# Patient Record
Sex: FEMALE | Race: WHITE | NOT HISPANIC OR LATINO | Employment: FULL TIME | URBAN - NONMETROPOLITAN AREA
[De-identification: names, ages, dates, MRNs, and addresses within clinical notes are randomized per-mention and may not be internally consistent; named-entity substitution may affect disease eponyms.]

---

## 2017-03-22 ENCOUNTER — OFFICE VISIT (OUTPATIENT)
Dept: URGENT CARE | Facility: PHYSICIAN GROUP | Age: 62
End: 2017-03-22
Payer: COMMERCIAL

## 2017-03-22 VITALS
HEART RATE: 70 BPM | OXYGEN SATURATION: 98 % | WEIGHT: 162 LBS | BODY MASS INDEX: 23.99 KG/M2 | HEIGHT: 69 IN | DIASTOLIC BLOOD PRESSURE: 80 MMHG | RESPIRATION RATE: 16 BRPM | SYSTOLIC BLOOD PRESSURE: 116 MMHG | TEMPERATURE: 98 F

## 2017-03-22 DIAGNOSIS — H01.004 BLEPHARITIS OF LEFT UPPER EYELID, UNSPECIFIED TYPE: ICD-10-CM

## 2017-03-22 PROCEDURE — 99214 OFFICE O/P EST MOD 30 MIN: CPT | Performed by: PHYSICIAN ASSISTANT

## 2017-03-22 RX ORDER — BACITRACIN 500 [USP'U]/G
0.5 OINTMENT OPHTHALMIC 4 TIMES DAILY
Qty: 1 TUBE | Refills: 0 | Status: SHIPPED | OUTPATIENT
Start: 2017-03-22 | End: 2021-02-06

## 2017-03-22 ASSESSMENT — ENCOUNTER SYMPTOMS
EYE PAIN: 0
BLURRED VISION: 0
DOUBLE VISION: 0
NAUSEA: 0
FEVER: 0
VOMITING: 0
CHILLS: 0
FOREIGN BODY SENSATION: 0
EYE REDNESS: 1
PHOTOPHOBIA: 0
EYE DISCHARGE: 1

## 2017-03-22 NOTE — PROGRESS NOTES
Subjective:      Vidhya Martinez is a 61 y.o. female who presents with Eye Problem            HPI Comments: Patient reports mild left upper eyelid erythema and swelling over the last couple of days which seems to be gradually worsening. Denies any trauma. Also reports the amount of discharge with crusting in the morning.    Eye Problem   The left eye is affected. This is a new problem. The current episode started in the past 7 days. The problem occurs constantly. The problem has been gradually worsening. There was no injury mechanism. The patient is experiencing no pain. There is no known exposure to pink eye. Associated symptoms include an eye discharge and eye redness. Pertinent negatives include no blurred vision, double vision, fever, foreign body sensation, itching, nausea, photophobia, recent URI or vomiting. She has tried nothing for the symptoms. The treatment provided no relief.       Review of Systems   Constitutional: Negative for fever and chills.   Eyes: Positive for discharge and redness. Negative for blurred vision, double vision, photophobia and pain.   Gastrointestinal: Negative for nausea and vomiting.   Skin: Negative for itching.     Allergies:Pcn and Sulfa drugs    Current Outpatient Prescriptions Ordered in [facility]   Medication Sig Dispense Refill   • Naproxen Sodium (ALEVE PO) Take  by mouth.     • bacitracin 500 UNIT/GM Ointment Place 0.5 Inches in left eye 4 times a day. 1 Tube 0     No current Epic-ordered facility-administered medications on file.       History reviewed. No pertinent past medical history.    Social History   Substance Use Topics   • Smoking status: Light Tobacco Smoker     Types: Cigarettes   • Smokeless tobacco: Former User      Comment: social smoker    • Alcohol Use: Yes      Comment: rare       No family status information on file.   History reviewed. No pertinent family history.       Objective:     /80 mmHg  Pulse 70  Temp(Src) 36.7 °C (98 °F)  Resp 16   "Ht 1.753 m (5' 9\")  Wt 73.483 kg (162 lb)  BMI 23.91 kg/m2  SpO2 98%     Physical Exam   Constitutional: She is oriented to person, place, and time. She appears well-developed and well-nourished. No distress.   HENT:   Head: Normocephalic and atraumatic.   Right Ear: External ear normal.   Left Ear: External ear normal.   Mouth/Throat: Oropharynx is clear and moist.   Eyes: EOM are normal. Pupils are equal, round, and reactive to light. Right eye exhibits no discharge. Left eye exhibits no discharge.   Mild erythema and edema of the left upper eyelid. Mild left conjunctival erythema   Neck: Normal range of motion. Neck supple.   Cardiovascular: Normal rate.    Pulmonary/Chest: Effort normal.   Neurological: She is alert and oriented to person, place, and time.   Skin: Skin is warm and dry. She is not diaphoretic.   Psychiatric: She has a normal mood and affect. Her behavior is normal. Judgment and thought content normal.   Nursing note and vitals reviewed.              Assessment/Plan:     1. Blepharitis of left upper eyelid, unspecified type  bacitracin 500 UNIT/GM Ointment    Mild erythema and very mild swelling. Start bacitracin ophthalmic. Follow-up with PCP. Return if worsening.       Elsevier Interactive Patient Education given: Blepharitis    Please note that this dictation was created using voice recognition software. I have made every reasonable attempt to correct obvious errors, but I expect that there are errors of grammar and possibly content that I did not discover before finalizing the note.      "

## 2017-03-22 NOTE — Clinical Note
March 22, 2017         Patient: Vidhya Martinez   YOB: 1955   Date of Visit: 3/22/2017           To Whom it May Concern:    Vidhya Martinez was seen in my clinic on 3/22/2017. Please excuse from work today. She may return tomorrow as long as symptoms have improved.    If you have any questions or concerns, please don't hesitate to call.        Sincerely,           Vicente Chinchilla PA-C  Electronically Signed

## 2017-03-22 NOTE — PATIENT INSTRUCTIONS
Blepharitis  Blepharitis is redness, soreness, and swelling (inflammation) of one or both eyelids. It may be caused by an allergic reaction or a bacterial infection. Blepharitis may also be associated with reddened, scaly skin (seborrhea) of the scalp and eyebrows. While you sleep, eye discharge may cause your eyelashes to stick together. Your eyelids may itch, burn, swell, and may lose their lashes. These will grow back. Your eyes may become sensitive. Blepharitis may recur and need repeated treatment. If this is the case, you may require further evaluation by an eye specialist (ophthalmologist).  HOME CARE INSTRUCTIONS   · Keep your hands clean.  · Use a clean towel each time you dry your eyelids. Do not use this towel to clean other areas. Do not share a towel or makeup with anyone.  · Wash your eyelids with warm water or warm water mixed with a small amount of baby shampoo. Do this twice a day or as often as needed.  · Wash your face and eyebrows at least once a day.  · Use warm compresses 2 times a day for 10 minutes at a time, or as directed by your caregiver.  · Apply antibiotic ointment as directed by your caregiver.  · Avoid rubbing your eyes.  · Avoid wearing makeup until you get better.  · Follow up with your caregiver as directed.  SEEK IMMEDIATE MEDICAL CARE IF:   · You have pain, redness, or swelling that gets worse or spreads to other parts of your face.  · Your vision changes, or you have pain when looking at lights or moving objects.  · You have a fever.  · Your symptoms continue for longer than 2 to 4 days or become worse.  MAKE SURE YOU:   · Understand these instructions.  · Will watch your condition.  · Will get help right away if you are not doing well or get worse.     This information is not intended to replace advice given to you by your health care provider. Make sure you discuss any questions you have with your health care provider.     Document Released: 12/15/2001 Document Revised:  03/11/2013 Document Reviewed: 04/11/2016  Elsevier Interactive Patient Education ©2016 Elsevier Inc.

## 2017-03-22 NOTE — MR AVS SNAPSHOT
"        Vidhya Martinez   3/22/2017 9:15 AM   Office Visit   MRN: [id number]    Department:  [facility]   Dept Phone:  [phone]    Description:  Female : 1955   Provider:  MOO Russell           Reason for Visit     Eye Problem pt states left eye redness x3 days       Allergies as of 3/22/2017     Allergen Noted Reactions    Pcn [Penicillins] 2011       Sulfa Drugs 2011         You were diagnosed with     Blepharitis of left upper eyelid, unspecified type   [[id number]]         Vital Signs     Blood Pressure Pulse Temperature Respirations Height Weight    116/80 mmHg 70 36.7 °C (98 °F) 16 1.753 m (5' 9\") 73.483 kg (162 lb)    Body Mass Index Oxygen Saturation Smoking Status             23.91 kg/m2 98% Light Tobacco Smoker         Basic Information     Date Of Birth Sex Race Ethnicity Preferred Language    1955 Female White Unknown English      Health Maintenance        Date Due Completion Dates    IMM DTaP/Tdap/Td Vaccine (1 - Tdap) 1976 ---    PAP SMEAR 1978 ---    MAMMOGRAM 1997 ---    COLONOSCOPY 2007 ---    IMM INFLUENZA (1) 2016 ---            Current Immunizations     Tuberculin Skin Test 5/10/2016  9:51 AM, 5/3/2016  3:17 PM      Below and/or attached are the medications your provider expects you to take. Review all of your home medications and newly ordered medications with your provider and/or pharmacist. Follow medication instructions as directed by your provider and/or pharmacist. Please keep your medication list with you and share with your provider. Update the information when medications are discontinued, doses are changed, or new medications (including over-the-counter products) are added; and carry medication information at all times in the event of emergency situations     Allergies:  PCN - (reactions not documented)     SULFA DRUGS - (reactions not documented)               Medications  Valid as of: 2017 - 10:10 AM   " Generic Name Brand Name Tablet Size Instructions for use    Bacitracin (Ointment) bacitracin 500 UNIT/GM Place 0.5 Inches in left eye 4 times a day.        Naproxen Sodium   Take  by mouth.        .                 Medicines prescribed today were sent to:     [facility][id number] - [place], [place] - [address]    [address] [place] [address]    Phone: [phone] Fax: [phone]    Open 24 Hours?: No      Medication refill instructions:       If your prescription bottle indicates you have medication refills left, it is not necessary to call your provider’s office. Please contact your pharmacy and they will refill your medication.    If your prescription bottle indicates you do not have any refills left, you may request refills at any time through one of the following ways: The online [employer] system (except Urgent Care), by calling your provider’s office, or by asking your pharmacy to contact your provider’s office with a refill request. Medication refills are processed only during regular business hours and may not be available until the next business day. Your provider may request additional information or to have a follow-up visit with you prior to refilling your medication.   *Please Note: Medication refills are assigned a new Rx number when refilled electronically. Your pharmacy may indicate that no refills were authorized even though a new prescription for the same medication is available at the pharmacy. Please request the medicine by name with the pharmacy before contacting your provider for a refill.        Instructions    Blepharitis  Blepharitis is redness, soreness, and swelling (inflammation) of one or both eyelids. It may be caused by an allergic reaction or a bacterial infection. Blepharitis may also be associated with reddened, scaly skin (seborrhea) of the scalp and eyebrows. While you sleep, eye discharge may cause your eyelashes to stick together. Your eyelids may itch, burn, swell, and  may lose their lashes. These will grow back. Your eyes may become sensitive. Blepharitis may recur and need repeated treatment. If this is the case, you may require further evaluation by an eye specialist (ophthalmologist).  HOME CARE INSTRUCTIONS   · Keep your hands clean.  · Use a clean towel each time you dry your eyelids. Do not use this towel to clean other areas. Do not share a towel or makeup with anyone.  · Wash your eyelids with warm water or warm water mixed with a small amount of baby shampoo. Do this twice a day or as often as needed.  · Wash your face and eyebrows at least once a day.  · Use warm compresses 2 times a day for 10 minutes at a time, or as directed by your caregiver.  · Apply antibiotic ointment as directed by your caregiver.  · Avoid rubbing your eyes.  · Avoid wearing makeup until you get better.  · Follow up with your caregiver as directed.  SEEK IMMEDIATE MEDICAL CARE IF:   · You have pain, redness, or swelling that gets worse or spreads to other parts of your face.  · Your vision changes, or you have pain when looking at lights or moving objects.  · You have a fever.  · Your symptoms continue for longer than 2 to 4 days or become worse.  MAKE SURE YOU:   · Understand these instructions.  · Will watch your condition.  · Will get help right away if you are not doing well or get worse.     This information is not intended to replace advice given to you by your health care provider. Make sure you discuss any questions you have with your health care provider.     Document Released: 12/15/2001 Document Revised: 03/11/2013 Document Reviewed: 04/11/2016  [employer] Interactive Patient Education ©2016 Elsevier Inc.            [employer] Access Code: Activation code not generated  Current [employer] Status: Active          Quit Tobacco Information     Do you want to quit using tobacco?    Quitting tobacco decreases risks of cancer, heart and lung disease, increases life expectancy, improves sense of  taste and smell, and increases spending money, among other benefits.    If you are thinking about quitting, we can help.  • [facility] Quit Tobacco Program: 945.321.8060  o Program occurs weekly for four weeks and includes pharmacist consultation on products to support quitting smoking or chewing tobacco. A provider referral is needed for pharmacist consultation.  • Tobacco Users Help Hotline: 5-582-QUIT-NOW (163-6292) or https://[place].quitlogix.org/  o Free, confidential telephone and online coaching for [place] residents. Sessions are designed on a schedule that is convenient for you. Eligible clients receive free nicotine replacement therapy.  • Nationally: www.smokefree.gov  o Information and professional assistance to support both immediate and long-term needs as you become, and remain, a non-smoker. Smokefree.gov allows you to choose the help that best fits your needs.

## 2018-08-28 ENCOUNTER — OFFICE VISIT (OUTPATIENT)
Dept: URGENT CARE | Facility: PHYSICIAN GROUP | Age: 63
End: 2018-08-28
Payer: COMMERCIAL

## 2018-08-28 ENCOUNTER — HOSPITAL ENCOUNTER (OUTPATIENT)
Facility: MEDICAL CENTER | Age: 63
End: 2018-08-28
Attending: PHYSICIAN ASSISTANT
Payer: COMMERCIAL

## 2018-08-28 VITALS
DIASTOLIC BLOOD PRESSURE: 88 MMHG | RESPIRATION RATE: 16 BRPM | OXYGEN SATURATION: 98 % | SYSTOLIC BLOOD PRESSURE: 128 MMHG | TEMPERATURE: 97 F | WEIGHT: 160 LBS | HEART RATE: 96 BPM | HEIGHT: 69 IN | BODY MASS INDEX: 23.7 KG/M2

## 2018-08-28 DIAGNOSIS — N30.01 ACUTE CYSTITIS WITH HEMATURIA: ICD-10-CM

## 2018-08-28 DIAGNOSIS — R30.0 DYSURIA: ICD-10-CM

## 2018-08-28 LAB
APPEARANCE UR: NORMAL
BILIRUB UR STRIP-MCNC: NORMAL MG/DL
COLOR UR AUTO: NORMAL
GLUCOSE UR STRIP.AUTO-MCNC: NORMAL MG/DL
KETONES UR STRIP.AUTO-MCNC: NORMAL MG/DL
LEUKOCYTE ESTERASE UR QL STRIP.AUTO: NORMAL
NITRITE UR QL STRIP.AUTO: POSITIVE
PH UR STRIP.AUTO: 7 [PH] (ref 5–8)
PROT UR QL STRIP: 100 MG/DL
RBC UR QL AUTO: NORMAL
SP GR UR STRIP.AUTO: 1.02
UROBILINOGEN UR STRIP-MCNC: NORMAL MG/DL

## 2018-08-28 PROCEDURE — 87186 SC STD MICRODIL/AGAR DIL: CPT

## 2018-08-28 PROCEDURE — 81002 URINALYSIS NONAUTO W/O SCOPE: CPT | Performed by: PHYSICIAN ASSISTANT

## 2018-08-28 PROCEDURE — 99214 OFFICE O/P EST MOD 30 MIN: CPT | Performed by: PHYSICIAN ASSISTANT

## 2018-08-28 PROCEDURE — 87077 CULTURE AEROBIC IDENTIFY: CPT

## 2018-08-28 PROCEDURE — 87086 URINE CULTURE/COLONY COUNT: CPT

## 2018-08-28 RX ORDER — CIPROFLOXACIN 500 MG/1
500 TABLET, FILM COATED ORAL EVERY 12 HOURS
Qty: 14 TAB | Refills: 0 | Status: SHIPPED | OUTPATIENT
Start: 2018-08-28 | End: 2018-09-04

## 2018-08-28 RX ORDER — PHENAZOPYRIDINE HYDROCHLORIDE 200 MG/1
200 TABLET, FILM COATED ORAL 3 TIMES DAILY
Qty: 6 TAB | Refills: 0 | Status: SHIPPED | OUTPATIENT
Start: 2018-08-28 | End: 2018-08-30

## 2018-08-29 DIAGNOSIS — N30.01 ACUTE CYSTITIS WITH HEMATURIA: ICD-10-CM

## 2018-08-29 NOTE — PROGRESS NOTES
"Chief Complaint   Patient presents with   • UTI       HISTORY OF PRESENT ILLNESS: Patient is a 62 y.o. female who presents today because she has a 3 day history of increased urinary urgency, and dysuria.  Denies any fevers, chills, nausea, vomiting or diarrhea or flank pain.  She has tried some over-the-counter Azo and that did not help    There are no active problems to display for this patient.      Allergies:Pcn [penicillins] and Sulfa drugs    Current Outpatient Prescriptions Ordered in [facility]   Medication Sig Dispense Refill   • ciprofloxacin (CIPRO) 500 MG Tab Take 1 Tab by mouth every 12 hours for 7 days. 14 Tab 0   • phenazopyridine (PYRIDIUM) 200 MG Tab Take 1 Tab by mouth 3 times a day for 2 days. 6 Tab 0   • Naproxen Sodium (ALEVE PO) Take  by mouth.     • bacitracin 500 UNIT/GM Ointment Place 0.5 Inches in left eye 4 times a day. 1 Tube 0     No current Epic-ordered facility-administered medications on file.        No past medical history on file.    Social History   Substance Use Topics   • Smoking status: Light Tobacco Smoker     Types: Cigarettes   • Smokeless tobacco: Former User      Comment: social smoker    • Alcohol use Yes      Comment: rare       No family status information on file.   No family history on file.    ROS:  Review of Systems   Constitutional: Negative for fever, chills, weight loss and malaise/fatigue.   HENT: Negative for ear pain, nosebleeds, congestion, sore throat and neck pain.    Eyes: Negative for blurred vision.   Respiratory: Negative for cough, sputum production, shortness of breath and wheezing.    Cardiovascular: Negative for chest pain, palpitations, orthopnea and leg swelling.   Gastrointestinal: Negative for heartburn, nausea, vomiting and positive for lower abdominal pain.   Genitourinary: Positive for dysuria, urgency and frequency.     Exam:  Blood pressure 128/88, pulse 96, temperature 36.1 °C (97 °F), resp. rate 16, height 1.753 m (5' 9\"), weight 72.6 kg (160 " lb), SpO2 98 %.  General:  Well nourished, well developed female in NAD  Head:Normocephalic, atraumatic  Eyes: PERRLA, EOM within normal limits, no conjunctival injection, no scleral icterus, visual fields and acuity grossly intact.  Pulmonary: chest is symmetrical with respiration, no wheezes, crackles, or rhonchi.  Cardiovascular: regular rate and rhythm without murmurs, rubs, or gallops.  Abdomen: Nondistended, bowel tones in all 4 quadrants, soft, she has suprapubic tenderness to palpation, no other tenderness to palpation  Extremities: no clubbing, cyanosis, or edema.    Urinalysis shows moderate amount of leukocyte esterase, positive nitrates, 100 protein and moderate blood    Please note that this dictation was created using voice recognition software. I have made every reasonable attempt to correct obvious errors, but I expect that there are errors of grammar and possibly content that I did not discover before finalizing the note.    Assessment/Plan:  1. Acute cystitis with hematuria  Urine Culture    ciprofloxacin (CIPRO) 500 MG Tab   2. Dysuria  POCT Urinalysis    phenazopyridine (PYRIDIUM) 200 MG Tab   Increase p.o. fluids    Followup with primary care in the next 7-10 days if not significantly improving, return to the urgent care or go to the emergency room sooner for any worsening of symptoms.

## 2018-08-29 NOTE — PATIENT INSTRUCTIONS
"Smoking Cessation, Tips for Success  If you are ready to quit smoking, congratulations! You have chosen to help yourself be healthier. Cigarettes bring nicotine, tar, carbon monoxide, and other irritants into your body. Your lungs, heart, and blood vessels will be able to work better without these poisons. There are many different ways to quit smoking. Nicotine gum, nicotine patches, a nicotine inhaler, or nicotine nasal spray can help with physical craving. Hypnosis, support groups, and medicines help break the habit of smoking.  WHAT THINGS CAN I DO TO MAKE QUITTING EASIER?   Here are some tips to help you quit for good:  · Pick a date when you will quit smoking completely. Tell all of your friends and family about your plan to quit on that date.  · Do not try to slowly cut down on the number of cigarettes you are smoking. Pick a quit date and quit smoking completely starting on that day.  · Throw away all cigarettes.    · Clean and remove all ashtrays from your home, work, and car.  · On a card, write down your reasons for quitting. Carry the card with you and read it when you get the urge to smoke.  · Cleanse your body of nicotine. Drink enough water and fluids to keep your urine clear or pale yellow. Do this after quitting to flush the nicotine from your body.  · Learn to predict your moods. Do not let a bad situation be your excuse to have a cigarette. Some situations in your life might tempt you into wanting a cigarette.  · Never have \"just one\" cigarette. It leads to wanting another and another. Remind yourself of your decision to quit.  · Change habits associated with smoking. If you smoked while driving or when feeling stressed, try other activities to replace smoking. Stand up when drinking your coffee. Brush your teeth after eating. Sit in a different chair when you read the paper. Avoid alcohol while trying to quit, and try to drink fewer caffeinated beverages. Alcohol and caffeine may urge you to " "smoke.  · Avoid foods and drinks that can trigger a desire to smoke, such as sugary or spicy foods and alcohol.  · Ask people who smoke not to smoke around you.  · Have something planned to do right after eating or having a cup of coffee. For example, plan to take a walk or exercise.  · Try a relaxation exercise to calm you down and decrease your stress. Remember, you may be tense and nervous for the first 2 weeks after you quit, but this will pass.  · Find new activities to keep your hands busy. Play with a pen, coin, or rubber band. Doodle or draw things on paper.  · Brush your teeth right after eating. This will help cut down on the craving for the taste of tobacco after meals. You can also try mouthwash.    · Use oral substitutes in place of cigarettes. Try using lemon drops, carrots, cinnamon sticks, or chewing gum. Keep them handy so they are available when you have the urge to smoke.  · When you have the urge to smoke, try deep breathing.  · Designate your home as a nonsmoking area.  · If you are a heavy smoker, ask your health care provider about a prescription for nicotine chewing gum. It can ease your withdrawal from nicotine.  · Reward yourself. Set aside the cigarette money you save and buy yourself something nice.  · Look for support from others. Join a support group or smoking cessation program. Ask someone at home or at work to help you with your plan to quit smoking.  · Always ask yourself, \"Do I need this cigarette or is this just a reflex?\" Tell yourself, \"Today, I choose not to smoke,\" or \"I do not want to smoke.\" You are reminding yourself of your decision to quit.  · Do not replace cigarette smoking with electronic cigarettes (commonly called e-cigarettes). The safety of e-cigarettes is unknown, and some may contain harmful chemicals.  · If you relapse, do not give up! Plan ahead and think about what you will do the next time you get the urge to smoke.  HOW WILL I FEEL WHEN I QUIT SMOKING?  You " may have symptoms of withdrawal because your body is used to nicotine (the addictive substance in cigarettes). You may crave cigarettes, be irritable, feel very hungry, cough often, get headaches, or have difficulty concentrating. The withdrawal symptoms are only temporary. They are strongest when you first quit but will go away within 10-14 days. When withdrawal symptoms occur, stay in control. Think about your reasons for quitting. Remind yourself that these are signs that your body is healing and getting used to being without cigarettes. Remember that withdrawal symptoms are easier to treat than the major diseases that smoking can cause.   Even after the withdrawal is over, expect periodic urges to smoke. However, these cravings are generally short lived and will go away whether you smoke or not. Do not smoke!  WHAT RESOURCES ARE AVAILABLE TO HELP ME QUIT SMOKING?  Your health care provider can direct you to community resources or hospitals for support, which may include:  · Group support.  · Education.  · Hypnosis.  · Therapy.     This information is not intended to replace advice given to you by your health care provider. Make sure you discuss any questions you have with your health care provider.     Document Released: 09/15/2005 Document Revised: 01/08/2016 Document Reviewed: 06/05/2014  ReviewPro Interactive Patient Education ©2016 [employer] Inc.

## 2018-08-31 LAB
BACTERIA UR CULT: ABNORMAL
BACTERIA UR CULT: ABNORMAL
SIGNIFICANT IND 70042: ABNORMAL
SITE SITE: ABNORMAL
SOURCE SOURCE: ABNORMAL

## 2019-09-27 ENCOUNTER — OCCUPATIONAL MEDICINE (OUTPATIENT)
Dept: URGENT CARE | Facility: PHYSICIAN GROUP | Age: 64
End: 2019-09-27
Payer: COMMERCIAL

## 2019-09-27 VITALS
OXYGEN SATURATION: 96 % | RESPIRATION RATE: 16 BRPM | BODY MASS INDEX: 25.18 KG/M2 | HEIGHT: 69 IN | HEART RATE: 84 BPM | SYSTOLIC BLOOD PRESSURE: 120 MMHG | TEMPERATURE: 98.6 F | WEIGHT: 170 LBS | DIASTOLIC BLOOD PRESSURE: 76 MMHG

## 2019-09-27 DIAGNOSIS — S39.012A STRAIN OF LUMBAR REGION, INITIAL ENCOUNTER: ICD-10-CM

## 2019-09-27 PROCEDURE — 99214 OFFICE O/P EST MOD 30 MIN: CPT | Mod: 29 | Performed by: PHYSICIAN ASSISTANT

## 2019-09-27 RX ORDER — CYCLOBENZAPRINE HCL 5 MG
5-10 TABLET ORAL NIGHTLY PRN
Qty: 15 TAB | Refills: 0 | Status: SHIPPED | OUTPATIENT
Start: 2019-09-27 | End: 2021-02-06

## 2019-09-27 ASSESSMENT — ENCOUNTER SYMPTOMS
TINGLING: 0
MYALGIAS: 0
BACK PAIN: 1
WEAKNESS: 0
NECK PAIN: 0
SENSORY CHANGE: 0
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
## 2019-09-27 NOTE — LETTER
September 27, 2019         Patient: Vidhya Martinez   YOB: 1955   Date of Visit: 9/27/2019           To Whom it May Concern:    Vidhya Martinez was seen in my clinic on 9/27/2019. She can return to work 9/30/19 with restrictions.     If you have any questions or concerns, please don't hesitate to call.        Sincerely,           Lupillo Jimenez P.A.-C.  Electronically Signed
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
## 2019-09-27 NOTE — LETTER
"EMPLOYEE’S CLAIM FOR COMPENSATION/ REPORT OF INITIAL TREATMENT  FORM C-4    EMPLOYEE’S CLAIM - PROVIDE ALL INFORMATION REQUESTED   First Name  Vidhya Last Name  Michelle Birthdate                    1955                Sex  female Claim Number   Home Address  [address] [place] Age  63 y.o. Height  1.753 m (5' 9\") Weight  77.1 kg (170 lb) [facility]     [facility] [place]  [address] Telephone  [phone] (home)    Mailing Address  [address] [place] Zip  [address] Primary Language Spoken  English    Insurer   Third Party   [employer]   Employee's Occupation (Job Title) When Injury or Occupational Disease Occurred      Employer's Name  [employer]  Telephone  [phone]   Employer Address  [address]  [address]    Date of Injury  9/26/2019               Hour of Injury  1:15 PM Date Employer Notified  9/26/2019 Last Day of Work after Injury or Occupational Disease  9/27/2019 Supervisor to Whom Injury Reported  Jonna   Address or Location of Accident (if applicable)  [[address] [place] [address]]   What were you doing at the time of accident? 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 (Be specific an answer in detail. Use additional sheet if necessary)  Lifting a rather large patient from a nichole that would not be able to raise patient high enough to get them back in bed with 3 other co-workers   If you believe that you have an occupational disease, when did you first have knowledge of the disability and it relationship to your employment?  na Witnesses to the Accident  3 co-workers      Nature of Injury or Occupational Disease  Strain  Part(s) of Body Injured or Affected  Lower Back Area (Lumbar Area & Lumbo-Sacral), ,     I certify that the above is true and " correct to the best of my knowledge and that I have provided this information in order to obtain the benefits of [place]  I hereby authorize any physician, chiropractor, surgeon, practitioner, or other person, any hospital, including [facility], any medical service organization, any insurance company, or other institution or organization to release to each other, any medical or other information, including benefits paid or payable, pertinent to this injury or disease, except information relative to diagnosis, treatment and/or counseling for AIDS, psychological conditions, alcohol or controlled substances, for which I must give specific authorization.  A Photostat of this authorization shall be as valid as the original.     Date 09/27/2019   [facility]   Employee’s Signature   THIS REPORT MUST BE COMPLETED AND MAILED WITHIN 3 WORKING DAYS OF TREATMENT   Place  [facility]  Name of [facility]   Date  9/27/2019 Diagnosis  (S39.012A) Strain of lumbar region, initial encounter Is there evidence the injured employee was under the influence of alcohol and/or another controlled substance at the time of accident?   Hour  10:02 AM Description of Injury or Disease  The encounter diagnosis was Strain of lumbar region, initial encounter. No   Treatment  Patient will continue using ibuprofen throughout the day and muscle relaxers at night as needed.  She will use heat on the area and discussed gentle stretches with patient.  She plans on resting her back over the weekend.  Patient will follow-up on Monday for reevaluation.  Have you advised the patient to remain off work five days or more? No   X-Ray Findings      If Yes   From Date  To Date      From information given by the employee, together with medical evidence, can you directly connect this injury or  "occupational disease as job incurred?  Yes If No Full Duty  No Modified Duty  Yes   Is additional medical care by a physician indicated?  Yes If Modified Duty, Specify any Limitations / Restrictions  Restrictions per D 39   Do you know of any previous injury or disease contributing to this condition or occupational disease?                            No   Date  9/27/2019 Print Doctor’s Name Rochelle Jimenez P.A.-C. I certify the employer’s copy of  this form was mailed on:   Address  [address] [place] [address] Insurer’s Use Only     [facility]  [address]    Provider’s Tax ID Number  [id number] Telephone  Dept: [phone]        e-ROCHELLE Falcon P.A.-C.   e-Signature: Dr. Eusebio Carvajal, Medical Director Degree  MD        ORIGINAL-TREATING PHYSICIAN OR CHIROPRACTOR    PAGE 2-INSURER/TPA    PAGE 3-EMPLOYER    PAGE 4-EMPLOYEE             Form C-4 (rev.10/07)              BRIEF DESCRIPTION OF RIGHTS AND BENEFITS  (Pursuant to [place]RS 616C.050)    Notice of Injury or Occupational Disease (Incident Report Form C-1): If an injury or occupational disease (OD) arises out of and in the course of employment, you must provide written notice to your employer as soon as practicable, but no later than 7 days after the accident or OD. Your employer shall maintain a sufficient supply of the required forms.    Claim for Compensation (Form C-4): If medical treatment is sought, the form C-4 is available at the place of initial treatment. A completed \"Claim for Compensation\" (Form C-4) must be filed within 90 days after an accident or OD. The treating physician or chiropractor must, within 3 working days after treatment, complete and mail to the employer, the employer's insurer and third-party , the Claim for Compensation.    Medical Treatment: If you require medical treatment for your on-the-job injury or OD, you may be required to select a physician or chiropractor from a list provided by your workers’ compensation " insurer, if it has contracted with an Organization for Managed Care (MCO) or Preferred Provider Organization (PPO) or providers of health care. If your employer has not entered into a contract with an MCO or PPO, you may select a physician or chiropractor from the Panel of Physicians and Chiropractors. Any medical costs related to your industrial injury or OD will be paid by your insurer.    Temporary Total Disability (TTD): If your doctor has certified that you are unable to work for a period of at least 5 consecutive days, or 5 cumulative days in a 20-day period, or places restrictions on you that your employer does not accommodate, you may be entitled to TTD compensation.    Temporary Partial Disability (TPD): If the wage you receive upon reemployment is less than the compensation for TTD to which you are entitled, the insurer may be required to pay you TPD compensation to make up the difference. TPD can only be paid for a maximum of 24 months.    Permanent Partial Disability (PPD): When your medical condition is stable and there is an indication of a PPD as a result of your injury or OD, within 30 days, your insurer must arrange for an evaluation by a rating physician or chiropractor to determine the degree of your PPD. The amount of your PPD award depends on the date of injury, the results of the PPD evaluation and your age and wage.    Permanent Total Disability (PTD): If you are medically certified by a treating physician or chiropractor as permanently and totally disabled and have been granted a PTD status by your insurer, you are entitled to receive monthly benefits not to exceed 66 2/3% of your average monthly wage. The amount of your PTD payments is subject to reduction if you previously received a PPD award.    Vocational Rehabilitation Services: You may be eligible for vocational rehabilitation services if you are unable to return to the job due to a permanent physical impairment or permanent  restrictions as a result of your injury or occupational disease.    Transportation and Per Miranda Reimbursement: You may be eligible for travel expenses and per miranda associated with medical treatment.    Reopening: You may be able to reopen your claim if your condition worsens after claim closure.    Appeal Process: If you disagree with a written determination issued by the insurer or the insurer does not respond to your request, you may appeal to the Department of Administration, , by following the instructions contained in your determination letter. You must appeal the determination within 70 days from the date of the determination letter at [address], [address], [place], [place] [address], or [address], [address], [place], [place] [address]. If you disagree with the  decision, you may appeal to the Department of Administration, . You must file your appeal within 30 days from the date of the  decision letter at [address], [address], [place], [place] [address], or [address], [facility] [address], [place], [place] [address]. If you disagree with a decision of an , you may file a petition for judicial review with the District Court. You must do so within 30 days of the Appeal Officer’s decision. You may be represented by an  at your own expense or you may contact the [facility] for possible representation.    [place]  for Injured Workers (NAIW): If you disagree with a  decision, you may request that NAIW represent you without charge at an  Hearing. For information regarding denial of benefits, you may contact the [facility] at: [address], [address], [place] [address], [phone], or [address], [address] [address], [phone]    To File a Complaint with the Division: If you wish to file a complaint with the  of the Division of  Industrial Relations (DIR),  please contact the Workers’ Compensation Section, [address], [address], [place], [place] [address], telephone [phone]) [phone], or [address], [address], [place], [place] [address], telephone [phone].    For assistance with Workers’ Compensation Issues: you may contact the Office of the [facility] Consumer Health Assistance, [address] [place] [address], [address], [place], [place] [address], Toll Free 1-289.260.7628, Web site: http://[employer].[facility].[place]., E-mail [email or web address].                             __________________________________________________________________                                                                   ______09/27/2019_____                Employee Name / Signature                                                                                                                                                       Date                                                                                                                                                                                                     D-2 (rev. 06/18)

## 2019-09-30 ENCOUNTER — OCCUPATIONAL MEDICINE (OUTPATIENT)
Dept: URGENT CARE | Facility: PHYSICIAN GROUP | Age: 64
End: 2019-09-30
Payer: COMMERCIAL

## 2019-09-30 VITALS
OXYGEN SATURATION: 96 % | WEIGHT: 170 LBS | TEMPERATURE: 97.8 F | BODY MASS INDEX: 25.18 KG/M2 | RESPIRATION RATE: 14 BRPM | SYSTOLIC BLOOD PRESSURE: 118 MMHG | HEART RATE: 88 BPM | DIASTOLIC BLOOD PRESSURE: 82 MMHG | HEIGHT: 69 IN

## 2019-09-30 DIAGNOSIS — S39.012A STRAIN OF LUMBAR REGION, INITIAL ENCOUNTER: ICD-10-CM

## 2019-09-30 PROCEDURE — 99213 OFFICE O/P EST LOW 20 MIN: CPT | Performed by: FAMILY MEDICINE

## 2019-09-30 NOTE — LETTER
[facility]  [address] [place] [address] [place] Herrera [address]  Phone:  [phone] - Fax:  [phone]   [facility] Progress Report and Disability Certification  Date of Service: 9/30/2019   No Show:  No  Date / Time of Next Visit: 10/7/2019   Claim Information   Patient Name: Vidhya Martinez  Claim Number:     Employer: [employer]  Date of Injury: 9/26/2019     Insurer / TPA: [employer]  ID / SSN:     Occupation:   Diagnosis: The encounter diagnosis was Strain of lumbar region, initial encounter.    Medical Information   Related to Industrial Injury? Yes    Subjective Complaints:  DOI 9/26/2019: Patient states she was lifting heavy patient from a [employer] lift that did not raise the patient high enough with 3 other coworkers and they had to lift the patient over to the bed when she strained her lower back.    Follow-up 9/30/2019: Patient endorses moderate improvement in back pain with mild increased stiffness at this point with associated decreased range of motion.  Patient denies current numbness, tingling, or weakness.  Patient would like to increase her work possibilities.  No new symptoms.   Objective Findings: Moderate muscular spasm noted to bilateral paraspinal musculature in the lumbar region.  DTRs intact bilateral lower extremities.  Neurovascularly intact bilateral lower extremities.  Strength intact bilateral lower extremities.  Gait intact.   Pre-Existing Condition(s):     Assessment:   Condition Improved    Status: Additional Care Required  Permanent Disability:No    Plan:      Diagnostics:      Comments:       Disability Information   Status: Released to Restricted Duty    From:  9/30/2019  Through: 10/7/2019 Restrictions are: Temporary   Physical Restrictions   Sitting:    Standing:    Stooping:    Bending:      Squatting:    Walking:    Climbing:    Pushing:      Pulling:    Other:    Reaching Above Shoulder (L):    Reaching Above Shoulder (R):       Reaching Below Shoulder (L):    Reaching Below Shoulder (R):      Not to exceed Weight Limits   Carrying(hrs):   Weight Limit(lb): < or = to 25 pounds Lifting(hrs):   Weight  Limit(lb): < or = to 25 pounds   Comments:      Repetitive Actions   Hands: i.e. Fine Manipulations from Grasping:     Feet: i.e. Operating Foot Controls:     Driving / Operate Machinery:     Physician Name: Taylor Jo M.D. Physician Signature: TAYLOR Beth M.D. e-Signature: Dr. Eusebio Carvajal, Medical Director   Clinic Name / Location: [address] [address] Clinic Phone Number: Dept: [phone]   Appointment Time: 9:20 Am Visit Start Time: 9:30 AM   Check-In Time:  9:24 Am Visit Discharge Time:  9:57 AM   Original-Treating Physician or Chiropractor    Page 2-Insurer/TPA    Page 3-Employer    Page 4-Employee

## 2019-09-30 NOTE — PATIENT INSTRUCTIONS
Lumbosacral Strain  Lumbosacral strain is an injury that causes pain in the lower back (lumbosacral spine). This injury usually occurs from overstretching the muscles or ligaments along your spine. A strain can affect one or more muscles or cord-like tissues that connect bones to other bones (ligaments).  What are the causes?  This condition may be caused by:  · A hard, direct hit (blow) to the back.  · Excessive stretching of the lower back muscles. This may result from:  ¨ A fall.  ¨ Lifting something heavy.  ¨ Repetitive movements such as bending or crouching.  What increases the risk?  The following factors may increase your risk of getting this condition:  · Participating in sports or activities that involve:  ¨ A sudden twist of the back.  ¨ Pushing or pulling motions.  · Being overweight or obese.  · Having poor strength and flexibility, especially tight hamstrings or weak muscles in the back or abdomen.  · Having too much of a curve in the lower back.  · Having a pelvis that is tilted forward.  What are the signs or symptoms?  The main symptom of this condition is pain in the lower back, at the site of the strain. Pain may extend (radiate) down one or both legs.  How is this diagnosed?  This condition is diagnosed based on:  · Your symptoms.  · Your medical history.  · A physical exam.  ¨ Your health care provider may push on certain areas of your back to determine the source of your pain.  ¨ You may be asked to bend forward, backward, and side to side to assess the severity of your pain and your range of motion.  · Imaging tests, such as:  ¨ X-rays.  ¨ MRI.  How is this treated?  Treatment for this condition may include:  · Putting heat and cold on the affected area.  · Medicines to help relieve pain and relax your muscles (muscle relaxants).  · NSAIDs to help reduce swelling and discomfort.  When your symptoms improve, it is important to gradually return to your normal routine as soon as possible to reduce  pain, avoid stiffness, and avoid loss of muscle strength. Generally, symptoms should improve within 6 weeks of treatment. However, recovery time varies.  Follow these instructions at home:  Managing pain, stiffness, and swelling  · If directed, put ice on the injured area during the first 24 hours after your strain.  ¨ Put ice in a plastic bag.  ¨ Place a towel between your skin and the bag.  ¨ Leave the ice on for 20 minutes, 2-3 times a day.  · If directed, put heat on the affected area as often as told by your health care provider. Use the heat source that your health care provider recommends, such as a moist heat pack or a heating pad.  ¨ Place a towel between your skin and the heat source.  ¨ Leave the heat on for 20-30 minutes.  ¨ Remove the heat if your skin turns bright red. This is especially important if you are unable to feel pain, heat, or cold. You may have a greater risk of getting burned.  Activity  · Rest and return to your normal activities as told by your health care provider. Ask your health care provider what activities are safe for you.  · Avoid activities that take a lot of energy for as long as told by your health care provider.  General instructions  · Take over-the-counter and prescription medicines only as told by your health care provider.  · Do not drive or use heavy machinery while taking prescription pain medicine.  · Do not use any products that contain nicotine or tobacco, such as cigarettes and e-cigarettes. If you need help quitting, ask your health care provider.  · Keep all follow-up visits as told by your health care provider. This is important.  How is this prevented?  · Use correct form when playing sports and lifting heavy objects.  · Use good posture when sitting and standing.  · Maintain a healthy weight.  · Sleep on a mattress with medium firmness to support your back.  · Be safe and responsible while being active to avoid falls.  · Do at least 150 minutes of  moderate-intensity exercise each week, such as brisk walking or water aerobics. Try a form of exercise that takes stress off your back, such as swimming or stationary cycling.  · Maintain physical fitness, including:  ¨ Strength.  ¨ Flexibility.  ¨ Cardiovascular fitness.  ¨ Endurance.  Contact a health care provider if:  · Your back pain does not improve after 6 weeks of treatment.  · Your symptoms get worse.  Get help right away if:  · Your back pain is severe.  · You cannot stand or walk.  · You have difficulty controlling when you urinate or when you have a bowel movement.  · You feel nauseous or you vomit.  · Your feet get very cold.  · You have numbness, tingling, weakness, or problems using your arms or legs.  · You develop any of the following:  ¨ Shortness of breath.  ¨ Dizziness.  ¨ Pain in your legs.  ¨ Weakness in your buttocks or legs.  ¨ Discoloration of the skin on your toes or legs.  This information is not intended to replace advice given to you by your health care provider. Make sure you discuss any questions you have with your health care provider.  Document Released: 09/27/2006 Document Revised: 07/07/2017 Document Reviewed: 05/21/2017  [employer] Interactive Patient Education © 2017 [employer]

## 2019-09-30 NOTE — PROGRESS NOTES
"Subjective:   Vidhya Martinez is a 63 y.o. female who presents for Follow-Up    DOI 9/26/2019: Patient states she was lifting heavy patient from a [employer] lift that did not raise the patient high enough with 3 other coworkers and they had to lift the patient over to the bed when she strained her lower back.    Follow-up 9/30/2019: Patient endorses moderate improvement in back pain with mild increased stiffness at this point with associated decreased range of motion.  Patient denies current numbness, tingling, or weakness.  Patient would like to increase her work possibilities.  No new symptoms.HPI  ROS   Objective:   /82 (BP Location: Right arm, Patient Position: Sitting, BP Cuff Size: Adult)   Pulse 88   Temp 36.6 °C (97.8 °F) (Temporal)   Resp 14   Ht 1.753 m (5' 9\")   Wt 77.1 kg (170 lb)   SpO2 96%   BMI 25.10 kg/m²   Physical Exam   Constitutional: She is oriented to person, place, and time. She appears well-developed and well-nourished. No distress.   Eyes: Pupils are equal, round, and reactive to light. EOM are normal.   Cardiovascular: Normal rate, regular rhythm, normal heart sounds and intact distal pulses.   Pulmonary/Chest: Effort normal and breath sounds normal. No respiratory distress.   Abdominal: Soft. Bowel sounds are normal. She exhibits no distension.   Musculoskeletal:        Lumbar back: She exhibits decreased range of motion, tenderness and spasm. She exhibits no bony tenderness and no deformity.   Neurological: She is alert and oriented to person, place, and time. She has normal reflexes.   Skin: Skin is warm and dry.   Psychiatric: She has a normal mood and affect. Her behavior is normal.     Moderate muscular spasm noted to bilateral paraspinal musculature in the lumbar region.  DTRs intact bilateral lower extremities.  Neurovascularly intact bilateral lower extremities.  Strength intact bilateral lower extremities.  Gait intact.  Assessment/Plan:   1. Strain of lumbar " region, initial encounter  Use over-the-counter pain reliever, such as acetaminophen (Tylenol), ibuprofen (Advil, Motrin) or naproxen (Aleve) as needed; follow package directions for dosing.    Differential diagnosis, natural history, supportive care, and indications for immediate follow-up discussed.

## 2019-10-07 ENCOUNTER — OCCUPATIONAL MEDICINE (OUTPATIENT)
Dept: URGENT CARE | Facility: PHYSICIAN GROUP | Age: 64
End: 2019-10-07
Payer: COMMERCIAL

## 2019-10-07 VITALS
BODY MASS INDEX: 25.18 KG/M2 | HEIGHT: 69 IN | HEART RATE: 92 BPM | WEIGHT: 170 LBS | SYSTOLIC BLOOD PRESSURE: 112 MMHG | TEMPERATURE: 97.6 F | OXYGEN SATURATION: 94 % | DIASTOLIC BLOOD PRESSURE: 70 MMHG | RESPIRATION RATE: 16 BRPM

## 2019-10-07 DIAGNOSIS — S39.012D STRAIN OF LUMBAR REGION, SUBSEQUENT ENCOUNTER: ICD-10-CM

## 2019-10-07 PROCEDURE — 99214 OFFICE O/P EST MOD 30 MIN: CPT | Mod: 29 | Performed by: PHYSICIAN ASSISTANT

## 2019-10-07 NOTE — PROGRESS NOTES
Chief Complaint   Patient presents with   • Follow-Up       HISTORY OF PRESENT ILLNESS: Patient is a 63 y.o. female who presents today because she is here for a Worker's Comp. follow-up visit.    This is her second visit in urgent care    DOI 9/26/2019: Patient states she was lifting heavy patient from a [employer] lift that did not raise the patient high enough with 3 other coworkers and they had to lift the patient over to the bed when she strained her lower back.     Her last visit just over a week ago she had been improving, her work responsibilities and restrictions were lifted she was advised to continue to use ibuprofen as needed.    She comes in today, with resolution of her pain      There are no active problems to display for this patient.      Allergies:Pcn [penicillins] and Sulfa drugs    Current Outpatient Medications Ordered in Epic   Medication Sig Dispense Refill   • cyclobenzaprine (FLEXERIL) 5 MG tablet Take 1-2 Tabs by mouth at bedtime as needed for Moderate Pain or Muscle Spasms. 15 Tab 0   • Naproxen Sodium (ALEVE PO) Take  by mouth.     • bacitracin 500 UNIT/GM Ointment Place 0.5 Inches in left eye 4 times a day. 1 Tube 0     No current Epic-ordered facility-administered medications on file.        History reviewed. No pertinent past medical history.    Social History     Tobacco Use   • Smoking status: Light Tobacco Smoker     Types: Cigarettes   • Smokeless tobacco: Former User   • Tobacco comment: social smoker    Substance Use Topics   • Alcohol use: Yes     Comment: rare   • Drug use: No       No family status information on file.   History reviewed. No pertinent family history.    ROS:  Review of Systems   Constitutional: Negative for fever, chills, weight loss and malaise/fatigue.   HENT: Negative for ear pain, nosebleeds, congestion, sore throat and neck pain.    Eyes: Negative for blurred vision.   Respiratory: Negative for cough, sputum production, shortness of breath and wheezing.   "  Cardiovascular: Negative for chest pain, palpitations, orthopnea and leg swelling.   Gastrointestinal: Negative for heartburn, nausea, vomiting and abdominal pain.   Genitourinary: Negative for dysuria, urgency and frequency.     Exam:  /70 (BP Location: Right arm, Patient Position: Sitting, BP Cuff Size: Adult)   Pulse 92   Temp 36.4 °C (97.6 °F) (Temporal)   Resp 16   Ht 1.753 m (5' 9\")   Wt 77.1 kg (170 lb)   SpO2 94%   General:  Well nourished, well developed female in NAD  Head:Normocephalic, atraumatic  Eyes: PERRLA, EOM within normal limits, no conjunctival injection, no scleral icterus, visual fields and acuity grossly intact.  Nose: Symmetrical without tenderness, no discharge.  Mouth: reasonable hygiene, no erythema exudates or tonsillar enlargement.  Neck: no masses, range of motion within normal limits, no tracheal deviation. No obvious thyroid enlargement.  Extremities: no clubbing, cyanosis, or edema.  MSK: No visible deformity, normal exam, normal range of motion.    Please note that this dictation was created using voice recognition software. I have made every reasonable attempt to correct obvious errors, but I expect that there are errors of grammar and possibly content that I did not discover before finalizing the note.    Assessment/Plan:  1. Strain of lumbar region, subsequent encounter         MMI.       "

## 2019-10-07 NOTE — PATIENT INSTRUCTIONS
Steps to Quit Smoking  Smoking tobacco can be harmful to your health and can affect almost every organ in your body. Smoking puts you, and those around you, at risk for developing many serious chronic diseases. Quitting smoking is difficult, but it is one of the best things that you can do for your health. It is never too late to quit.  What are the benefits of quitting smoking?  When you quit smoking, you lower your risk of developing serious diseases and conditions, such as:  · Lung cancer or lung disease, such as COPD.  · Heart disease.  · Stroke.  · Heart attack.  · Infertility.  · Osteoporosis and bone fractures.  Additionally, symptoms such as coughing, wheezing, and shortness of breath may get better when you quit. You may also find that you get sick less often because your body is stronger at fighting off colds and infections. If you are pregnant, quitting smoking can help to reduce your chances of having a baby of low birth weight.  How do I get ready to quit?  When you decide to quit smoking, create a plan to make sure that you are successful. Before you quit:  · Pick a date to quit. Set a date within the next two weeks to give you time to prepare.  · Write down the reasons why you are quitting. Keep this list in places where you will see it often, such as on your bathroom mirror or in your car or wallet.  · Identify the people, places, things, and activities that make you want to smoke (triggers) and avoid them. Make sure to take these actions:  ¨ Throw away all cigarettes at home, at work, and in your car.  ¨ Throw away smoking accessories, such as ashtrays and lighters.  ¨ Clean your car and make sure to empty the ashtray.  ¨ Clean your home, including curtains and carpets.  · Tell your family, friends, and coworkers that you are quitting. Support from your loved ones can make quitting easier.  · Talk with your health care provider about your options for quitting smoking.  · Find out what treatment  options are covered by your health insurance.  What strategies can I use to quit smoking?  Talk with your healthcare provider about different strategies to quit smoking. Some strategies include:  · Quitting smoking altogether instead of gradually lessening how much you smoke over a period of time. Research shows that quitting “cold turkey” is more successful than gradually quitting.  · Attending in-person counseling to help you build problem-solving skills. You are more likely to have success in quitting if you attend several counseling sessions. Even short sessions of 10 minutes can be effective.  · Finding resources and support systems that can help you to quit smoking and remain smoke-free after you quit. These resources are most helpful when you use them often. They can include:  ¨ Online chats with a counselor.  ¨ Telephone quitlines.  ¨ Printed self-help materials.  ¨ Support groups or group counseling.  ¨ Text messaging programs.  ¨ Mobile phone applications.  · Taking medicines to help you quit smoking. (If you are pregnant or breastfeeding, talk with your health care provider first.) Some medicines contain nicotine and some do not. Both types of medicines help with cravings, but the medicines that include nicotine help to relieve withdrawal symptoms. Your health care provider may recommend:  ¨ Nicotine patches, gum, or lozenges.  ¨ Nicotine inhalers or sprays.  ¨ Non-nicotine medicine that is taken by mouth.  Talk with your health care provider about combining strategies, such as taking medicines while you are also receiving in-person counseling. Using these two strategies together makes you more likely to succeed in quitting than if you used either strategy on its own.  If you are pregnant or breastfeeding, talk with your health care provider about finding counseling or other support strategies to quit smoking. Do not take medicine to help you quit smoking unless told to do so by your health care  provider.  What things can I do to make it easier to quit?  Quitting smoking might feel overwhelming at first, but there is a lot that you can do to make it easier. Take these important actions:  · Reach out to your family and friends and ask that they support and encourage you during this time. Call telephone quitlines, reach out to support groups, or work with a counselor for support.  · Ask people who smoke to avoid smoking around you.  · Avoid places that trigger you to smoke, such as bars, parties, or smoke-break areas at work.  · Spend time around people who do not smoke.  · Lessen stress in your life, because stress can be a smoking trigger for some people. To lessen stress, try:  ¨ Exercising regularly.  ¨ Deep-breathing exercises.  ¨ Yoga.  ¨ Meditating.  ¨ Performing a body scan. This involves closing your eyes, scanning your body from head to toe, and noticing which parts of your body are particularly tense. Purposefully relax the muscles in those areas.  · Download or purchase mobile phone or tablet apps (applications) that can help you stick to your quit plan by providing reminders, tips, and encouragement. There are many free apps, such as QuitGuide from the CDC (Centers for Disease Control and Prevention). You can find other support for quitting smoking (smoking cessation) through smokefree.gov and other websites.  How will I feel when I quit smoking?  Within the first 24 hours of quitting smoking, you may start to feel some withdrawal symptoms. These symptoms are usually most noticeable 2-3 days after quitting, but they usually do not last beyond 2-3 weeks. Changes or symptoms that you might experience include:  · Mood swings.  · Restlessness, anxiety, or irritation.  · Difficulty concentrating.  · Dizziness.  · Strong cravings for sugary foods in addition to nicotine.  · Mild weight gain.  · Constipation.  · Nausea.  · Coughing or a sore throat.  · Changes in how your medicines work in your  body.  · A depressed mood.  · Difficulty sleeping (insomnia).  After the first 2-3 weeks of quitting, you may start to notice more positive results, such as:  · Improved sense of smell and taste.  · Decreased coughing and sore throat.  · Slower heart rate.  · Lower blood pressure.  · Clearer skin.  · The ability to breathe more easily.  · Fewer sick days.  Quitting smoking is very challenging for most people. Do not get discouraged if you are not successful the first time. Some people need to make many attempts to quit before they achieve long-term success. Do your best to stick to your quit plan, and talk with your health care provider if you have any questions or concerns.  This information is not intended to replace advice given to you by your health care provider. Make sure you discuss any questions you have with your health care provider.  Document Released: 12/12/2002 Document Revised: 08/15/2017 Document Reviewed: 05/03/2016  [employer] Interactive Patient Education © 2017 [employer] Inc.

## 2019-10-07 NOTE — LETTER
[address] [place] [address]  Phone:  [phone] - Fax:  [phone]   [facility] Progress Report and Disability Certification  Date of Service: 10/7/2019   No Show:  No  Date / Time of Next Visit:     Claim Information   Patient Name: Vidhya Martinez  Claim Number:     Employer: [employer]  Date of Injury: 9/26/2019     Insurer / TPA: [employer]  ID / SSN:     Occupation:   Diagnosis: The encounter diagnosis was Strain of lumbar region, subsequent encounter.    Medical Information   Related to Industrial Injury?   YES   Subjective Complaints:  Resolved low back pain   Objective Findings: MSK: No visible deformity, normal exam, normal range of motion.     Pre-Existing Condition(s):     Assessment:   Condition Improved    Status: Discharged /  MMI  Permanent Disability:     Plan:      Diagnostics:      Comments:       Disability Information   Status: Released to Full Duty    From:     Through:   Restrictions are:     Physical Restrictions   Sitting:    Standing:    Stooping:    Bending:      Squatting:    Walking:    Climbing:    Pushing:      Pulling:    Other:    Reaching Above Shoulder (L):   Reaching Above Shoulder (R):       Reaching Below Shoulder (L):    Reaching Below Shoulder (R):      Not to exceed Weight Limits   Carrying(hrs):   Weight Limit(lb):   Lifting(hrs):   Weight  Limit(lb):     Comments:      Repetitive Actions   Hands: i.e. Fine Manipulations from Grasping:     Feet: i.e. Operating Foot Controls:     Driving / Operate Machinery:     Physician Name: Wil Cabezas P.A.-C. Physician Signature: WIL Ramirez P.A.-C. e-Signature: Dr. Eusebio Carvajal, Medical Director   Clinic Name / Location: [place] [address] [place] [address] Clinic Phone Number: Dept: [phone]   Appointment Time: 9:20 Am Visit Start Time: 9:22 AM   Check-In Time:  9:20  Am Visit Discharge Time:  9:59 AM   Original-Treating Physician or Chiropractor    Page 2-Insurer/TPA    Page 3-Employer    Page 4-Employee

## 2020-02-18 ENCOUNTER — OFFICE VISIT (OUTPATIENT)
Dept: URGENT CARE | Facility: PHYSICIAN GROUP | Age: 65
End: 2020-02-18
Payer: OTHER MISCELLANEOUS

## 2020-02-18 VITALS
RESPIRATION RATE: 14 BRPM | BODY MASS INDEX: 25.03 KG/M2 | WEIGHT: 169 LBS | TEMPERATURE: 97.2 F | HEIGHT: 69 IN | DIASTOLIC BLOOD PRESSURE: 72 MMHG | OXYGEN SATURATION: 97 % | HEART RATE: 96 BPM | SYSTOLIC BLOOD PRESSURE: 116 MMHG

## 2020-02-18 DIAGNOSIS — R06.2 WHEEZING: ICD-10-CM

## 2020-02-18 DIAGNOSIS — J98.8 RESPIRATORY TRACT INFECTION: ICD-10-CM

## 2020-02-18 DIAGNOSIS — R19.7 DIARRHEA, UNSPECIFIED TYPE: ICD-10-CM

## 2020-02-18 DIAGNOSIS — T63.301A SPIDER BITE WOUND, ACCIDENTAL OR UNINTENTIONAL, INITIAL ENCOUNTER: ICD-10-CM

## 2020-02-18 PROCEDURE — 94640 AIRWAY INHALATION TREATMENT: CPT | Performed by: PHYSICIAN ASSISTANT

## 2020-02-18 PROCEDURE — 99214 OFFICE O/P EST MOD 30 MIN: CPT | Mod: 25 | Performed by: PHYSICIAN ASSISTANT

## 2020-02-18 RX ORDER — PREDNISONE 20 MG/1
20 TABLET ORAL DAILY
Qty: 5 TAB | Refills: 0 | Status: SHIPPED | OUTPATIENT
Start: 2020-02-18 | End: 2020-02-23

## 2020-02-18 RX ORDER — IPRATROPIUM BROMIDE AND ALBUTEROL SULFATE 2.5; .5 MG/3ML; MG/3ML
3 SOLUTION RESPIRATORY (INHALATION) ONCE
Status: COMPLETED | OUTPATIENT
Start: 2020-02-18 | End: 2020-02-18

## 2020-02-18 RX ORDER — DOXYCYCLINE HYCLATE 100 MG
100 TABLET ORAL 2 TIMES DAILY
Qty: 14 TAB | Refills: 0 | Status: SHIPPED | OUTPATIENT
Start: 2020-02-18 | End: 2020-02-25

## 2020-02-18 RX ORDER — ALBUTEROL SULFATE 90 UG/1
2 AEROSOL, METERED RESPIRATORY (INHALATION) EVERY 6 HOURS PRN
Qty: 8.5 G | Refills: 0 | Status: SHIPPED | OUTPATIENT
Start: 2020-02-18 | End: 2021-02-06

## 2020-02-18 RX ADMIN — IPRATROPIUM BROMIDE AND ALBUTEROL SULFATE 3 ML: 2.5; .5 SOLUTION RESPIRATORY (INHALATION) at 18:21

## 2020-02-18 ASSESSMENT — ENCOUNTER SYMPTOMS
HEADACHES: 0
VOMITING: 0
EYE DISCHARGE: 0
EYE REDNESS: 0
SORE THROAT: 0
DIARRHEA: 1
WHEEZING: 1
MYALGIAS: 1
FEVER: 1
COUGH: 1
ABDOMINAL PAIN: 0
SHORTNESS OF BREATH: 1

## 2020-02-18 NOTE — LETTER
[facility]  [address]  [facility] [address]     February 18, 2020    Patient: Vidhya Martinez   YOB: 1955   Date of Visit: 2/18/2020       To Whom It May Concern:    Vidhya Martinez was seen and treated in our department on 2/18/2020. Please excuse her from work 2/18-2/19,    Sincerely,     Esther Masterson P.A.-C.

## 2020-02-19 NOTE — PROGRESS NOTES
Subjective:      Vidhya Martinez is a 64 y.o. female who presents with URI and Diarrhea        URI    This is a new problem. Episode onset: x 3-4 days ago. The problem has been unchanged. Maximum temperature: The patient reports an intermittent fever with a max temp of 102. Associated symptoms include congestion, coughing (The patient reports a productive cough), diarrhea, a rash and wheezing (intermittent). Pertinent negatives include no abdominal pain, chest pain, ear pain, headaches, sore throat or vomiting. Treatments tried: Nyqyuil and Mucinex.   Diarrhea    This is a new problem. The current episode started yesterday. The problem occurs 2 to 4 times per day. The problem has been unchanged. The stool consistency is described as watery. The patient states that diarrhea does not awaken her from sleep. Associated symptoms include coughing (The patient reports a productive cough), a fever, myalgias and a URI. Pertinent negatives include no abdominal pain, headaches or vomiting. There are no known risk factors. She has tried nothing for the symptoms.     The patient is also c/o a possible spider bite to her right foot x 3 days ago. The patient reports associated pain/tenderness, swelling, and redness to area. She also report slight increased warmth. No discharge/drainage. The patient applying a topical bite remeady to the area.     PMH:  has no past medical history on file.  MEDS:   Current Outpatient Medications:   •  cyclobenzaprine (FLEXERIL) 5 MG tablet, Take 1-2 Tabs by mouth at bedtime as needed for Moderate Pain or Muscle Spasms., Disp: 15 Tab, Rfl: 0  •  Naproxen Sodium (ALEVE PO), Take  by mouth., Disp: , Rfl:   •  bacitracin 500 UNIT/GM Ointment, Place 0.5 Inches in left eye 4 times a day., Disp: 1 Tube, Rfl: 0  ALLERGIES:   Allergies   Allergen Reactions   • Pcn [Penicillins]    • Sulfa Drugs      SURGHX: History reviewed. No pertinent surgical history.  SOCHX:  reports that she has been smoking  "cigarettes. She has quit using smokeless tobacco. She reports current alcohol use. She reports that she does not use drugs.  FH: Family history was reviewed, no pertinent findings to report    Review of Systems   Constitutional: Positive for fever.   HENT: Positive for congestion. Negative for ear pain and sore throat.    Eyes: Negative for discharge and redness.   Respiratory: Positive for cough (The patient reports a productive cough), shortness of breath (intermittent secondary to cough and congestion) and wheezing (intermittent).    Cardiovascular: Negative for chest pain and leg swelling.   Gastrointestinal: Positive for diarrhea. Negative for abdominal pain and vomiting.   Musculoskeletal: Positive for myalgias.   Skin: Positive for rash.   Neurological: Negative for headaches.          Objective:     /72 (BP Location: Right arm, Patient Position: Sitting, BP Cuff Size: Adult)   Pulse 96   Temp 36.2 °C (97.2 °F) (Temporal)   Resp 14   Ht 1.753 m (5' 9\")   Wt 76.7 kg (169 lb)   SpO2 97%   BMI 24.96 kg/m²      Physical Exam  Constitutional:       General: She is not in acute distress.     Appearance: Normal appearance.   HENT:      Head: Normocephalic and atraumatic.      Right Ear: Tympanic membrane, ear canal and external ear normal.      Left Ear: Tympanic membrane, ear canal and external ear normal.      Nose: Nose normal.      Mouth/Throat:      Mouth: Mucous membranes are moist.      Pharynx: Oropharynx is clear. No posterior oropharyngeal erythema.   Eyes:      Extraocular Movements: Extraocular movements intact.      Conjunctiva/sclera: Conjunctivae normal.   Neck:      Musculoskeletal: Normal range of motion and neck supple.   Cardiovascular:      Rate and Rhythm: Normal rate and regular rhythm.      Heart sounds: Normal heart sounds.   Pulmonary:      Effort: Pulmonary effort is normal. No respiratory distress.      Breath sounds: Wheezing (diffuse expiratory wheezes) present.   "   Musculoskeletal: Normal range of motion.        Feet:       Comments:   Left Foot:   Small localized area of erythema to the medial arch of the patient's left foot.  No tenderness to palpation.  No swelling.  No surrounding erythema.  No increased warmth. No induration. No palpable fluctuance.  No open wound/lesions.  No vesicles.  No pustules.   Skin:     General: Skin is warm and dry.   Neurological:      Mental Status: She is alert and oriented to person, place, and time.            Progress:  Duoneb Nebulizer given in clinic.   The patient reports improvement of her symptoms after the breathing treatment.      Assessment/Plan:     1. Respiratory tract infection  - doxycycline (VIBRAMYCIN) 100 MG Tab; Take 1 Tab by mouth 2 times a day for 7 days.  Dispense: 14 Tab; Refill: 0  - predniSONE (DELTASONE) 20 MG Tab; Take 1 Tab by mouth every day for 5 days.  Dispense: 5 Tab; Refill: 0  - albuterol 108 (90 Base) MCG/ACT Aero Soln inhalation aerosol; Inhale 2 Puffs by mouth every 6 hours as needed for Shortness of Breath.  Dispense: 8.5 g; Refill: 0    2. Wheezing  - ipratropium-albuterol (DUONEB) nebulizer solution  - predniSONE (DELTASONE) 20 MG Tab; Take 1 Tab by mouth every day for 5 days.  Dispense: 5 Tab; Refill: 0  - albuterol 108 (90 Base) MCG/ACT Aero Soln inhalation aerosol; Inhale 2 Puffs by mouth every 6 hours as needed for Shortness of Breath.  Dispense: 8.5 g; Refill: 0    3. Diarrhea, unspecified type  -- Discussed likely viral etiology. Recommend the BRAT diet.     4. Spider bite wound, accidental or unintentional, initial encounter  -- The patient's possible spider bite does not appear secondarily infected at this time.     Differential diagnoses, supportive care, and indications for immediate follow-up discussed with patient.   Instructed to return to clinic or nearest emergency department for any change in condition, further concerns, or worsening of symptoms.    OTC Tylenol or Motrin for  fever/discomfort.  OTC cough/cold medication for symptomatic relief  OTC Supportive Care for Congestion - saline nasal spray or neti pot  Drink plenty of fluids  [place] diet   -- Recommend the BRAT diet for the patient's diarrhea  Follow-up with PCP  Return to clinic or go to the ED if symptoms worsen or fail to improve, or if the patient should develop worsening/increasing cough, congestion, ear pain, sore throat, shortness of breath, wheezing, chest pain, worsening increasing/persistent diarrhea, nausea vomiting, abdominal pain, urinary symptoms, worsening/increasing/persistent pain/tenderness to the right foot, swelling, increased redness or warmth, discharge/drainage, secondary signs of infection, fever/chills, and/or any concerning symptoms.    Discussed plan with the patient, and she agrees to the above.     Please note that this dictation was created using voice recognition software. I have made every reasonable attempt to correct obvious errors, but I expect that there may be errors of grammar and possibly content that I did not discover before finalizing the note.

## 2020-03-12 ENCOUNTER — OFFICE VISIT (OUTPATIENT)
Dept: URGENT CARE | Facility: PHYSICIAN GROUP | Age: 65
End: 2020-03-12
Payer: OTHER MISCELLANEOUS

## 2020-03-12 VITALS
HEART RATE: 84 BPM | WEIGHT: 173 LBS | OXYGEN SATURATION: 98 % | DIASTOLIC BLOOD PRESSURE: 84 MMHG | SYSTOLIC BLOOD PRESSURE: 140 MMHG | HEIGHT: 69 IN | TEMPERATURE: 97.5 F | RESPIRATION RATE: 16 BRPM | BODY MASS INDEX: 25.62 KG/M2

## 2020-03-12 DIAGNOSIS — R03.0 ELEVATED BLOOD PRESSURE READING: ICD-10-CM

## 2020-03-12 DIAGNOSIS — J98.8 RESPIRATORY TRACT INFECTION: ICD-10-CM

## 2020-03-12 DIAGNOSIS — R06.2 WHEEZING: ICD-10-CM

## 2020-03-12 PROCEDURE — 99214 OFFICE O/P EST MOD 30 MIN: CPT | Performed by: PHYSICIAN ASSISTANT

## 2020-03-12 RX ORDER — ALBUTEROL SULFATE 90 UG/1
2 AEROSOL, METERED RESPIRATORY (INHALATION) EVERY 4 HOURS PRN
Qty: 1 INHALER | Refills: 0 | Status: SHIPPED | OUTPATIENT
Start: 2020-03-12

## 2020-03-12 RX ORDER — PREDNISONE 10 MG/1
TABLET ORAL
Qty: 30 TAB | Refills: 0 | Status: SHIPPED | OUTPATIENT
Start: 2020-03-12 | End: 2021-02-06

## 2020-03-12 RX ORDER — AZITHROMYCIN 250 MG/1
TABLET, FILM COATED ORAL
Qty: 6 TAB | Refills: 0 | Status: SHIPPED | OUTPATIENT
Start: 2020-03-12 | End: 2021-02-06

## 2020-03-12 NOTE — PROGRESS NOTES
Chief Complaint   Patient presents with   • Cough       HISTORY OF PRESENT ILLNESS: Patient is a 64 y.o. female who presents today because she was seen about 3 weeks ago and was diagnosed with a lower respiratory tract infection and put on doxycycline, albuterol and a 5-day course of steroids.  While she was on the medication she felt somewhat better but her symptoms returned and have gradually worsened since then.  Reports intermittent fevers.  She has been taking Tylenol.    There are no active problems to display for this patient.      Allergies:Pcn [penicillins] and Sulfa drugs    Current Outpatient Medications Ordered in Epic   Medication Sig Dispense Refill   • azithromycin (ZITHROMAX) 250 MG Tab Follow package directions 6 Tab 0   • predniSONE (DELTASONE) 10 MG Tab 4 PO QD X 4 day, 3 PO QD x 3 days, 2 PO QD x 2 days, 1 PO QD x 1 day 30 Tab 0   • albuterol 108 (90 Base) MCG/ACT Aero Soln inhalation aerosol Inhale 2 Puffs by mouth every four hours as needed. 1 Inhaler 0   • albuterol 108 (90 Base) MCG/ACT Aero Soln inhalation aerosol Inhale 2 Puffs by mouth every 6 hours as needed for Shortness of Breath. 8.5 g 0   • cyclobenzaprine (FLEXERIL) 5 MG tablet Take 1-2 Tabs by mouth at bedtime as needed for Moderate Pain or Muscle Spasms. 15 Tab 0   • Naproxen Sodium (ALEVE PO) Take  by mouth.     • bacitracin 500 UNIT/GM Ointment Place 0.5 Inches in left eye 4 times a day. 1 Tube 0     No current Epic-ordered facility-administered medications on file.        History reviewed. No pertinent past medical history.    Social History     Tobacco Use   • Smoking status: Light Tobacco Smoker     Types: Cigarettes   • Smokeless tobacco: Former User   • Tobacco comment: social smoker    Substance Use Topics   • Alcohol use: Yes     Comment: rare   • Drug use: No       No family status information on file.   History reviewed. No pertinent family history.    ROS:  Review of Systems   Constitutional: Positive for intermittent  "fever, no chills, weight loss and malaise/fatigue.   HENT: Negative for ear pain, nosebleeds, congestion, sore throat and neck pain.    Eyes: Negative for blurred vision.   Respiratory: Positive for cough, sputum production, shortness of breath and wheezing.    Cardiovascular: Negative for chest pain, palpitations, orthopnea and leg swelling.   Gastrointestinal: Negative for heartburn, nausea, vomiting and abdominal pain.   Genitourinary: Negative for dysuria, urgency and frequency.     Exam:  /84 (BP Location: Right arm, Patient Position: Sitting, BP Cuff Size: Adult)   Pulse 84   Temp 36.4 °C (97.5 °F) (Temporal)   Resp 16   Ht 1.753 m (5' 9\")   Wt 78.5 kg (173 lb)   SpO2 98%   General:  Well nourished, well developed female in NAD  Head:Normocephalic, atraumatic  Eyes: PERRLA, EOM within normal limits, no conjunctival injection, no scleral icterus, visual fields and acuity grossly intact.  Ears: Normal shape and symmetry, no tenderness, no discharge. External canals are without any significant edema or erythema. Tympanic membranes are without any inflammation, no effusion. Gross auditory acuity is intact  Nose: Symmetrical without tenderness, no discharge.  Mouth: reasonable hygiene, no erythema exudates or tonsillar enlargement.  Neck: no masses, range of motion within normal limits, no tracheal deviation. No obvious thyroid enlargement.  Pulmonary: chest is symmetrical with respiration, she has scattered wheezes and rhonchi in the right, not clearing with cough   cardiovascular: regular rate and rhythm without murmurs, rubs, or gallops.  Extremities: no clubbing, cyanosis, or edema.    Please note that this dictation was created using voice recognition software. I have made every reasonable attempt to correct obvious errors, but I expect that there are errors of grammar and possibly content that I did not discover before finalizing the note.    Assessment/Plan:  1. Respiratory tract infection  " azithromycin (ZITHROMAX) 250 MG Tab    predniSONE (DELTASONE) 10 MG Tab   2. Elevated blood pressure reading     3. Wheezing  albuterol 108 (90 Base) MCG/ACT Aero Soln inhalation aerosol       Followup with primary care in the next 7-10 days if not significantly improving, return to the urgent care or go to the emergency room sooner for any worsening of symptoms.

## 2020-03-12 NOTE — LETTER
March 12, 2020         Patient: Vidhya Martinez   YOB: 1955   Date of Visit: 3/12/2020           To Whom it May Concern:    Vidhya Martinez was seen in my clinic on 3/12/2020. She may return to work on 3/15/2020, please excuse any recent absences.    If you have any questions or concerns, please don't hesitate to call.        Sincerely,           Wil Cabezas P.A.-C.  Electronically Signed

## 2020-04-08 ENCOUNTER — HOSPITAL ENCOUNTER (OUTPATIENT)
Facility: MEDICAL CENTER | Age: 65
End: 2020-04-08
Payer: OTHER MISCELLANEOUS

## 2020-04-09 ENCOUNTER — HOSPITAL ENCOUNTER (OUTPATIENT)
Facility: MEDICAL CENTER | Age: 65
End: 2020-04-09
Payer: OTHER MISCELLANEOUS

## 2020-04-29 ENCOUNTER — HOSPITAL ENCOUNTER (OUTPATIENT)
Dept: HOSPITAL 8 - CACL | Age: 65
Setting detail: OBSERVATION
LOS: 1 days | Discharge: HOME | End: 2020-04-30
Attending: INTERNAL MEDICINE | Admitting: INTERNAL MEDICINE
Payer: COMMERCIAL

## 2020-04-29 VITALS — HEIGHT: 69 IN | WEIGHT: 172.18 LBS | BODY MASS INDEX: 25.5 KG/M2

## 2020-04-29 VITALS — SYSTOLIC BLOOD PRESSURE: 146 MMHG | DIASTOLIC BLOOD PRESSURE: 85 MMHG

## 2020-04-29 VITALS — DIASTOLIC BLOOD PRESSURE: 83 MMHG | SYSTOLIC BLOOD PRESSURE: 141 MMHG

## 2020-04-29 VITALS — SYSTOLIC BLOOD PRESSURE: 146 MMHG | DIASTOLIC BLOOD PRESSURE: 107 MMHG

## 2020-04-29 DIAGNOSIS — E78.5: ICD-10-CM

## 2020-04-29 DIAGNOSIS — J44.9: ICD-10-CM

## 2020-04-29 DIAGNOSIS — Z87.891: ICD-10-CM

## 2020-04-29 DIAGNOSIS — R07.9: ICD-10-CM

## 2020-04-29 DIAGNOSIS — Z79.899: ICD-10-CM

## 2020-04-29 DIAGNOSIS — I25.110: Primary | ICD-10-CM

## 2020-04-29 DIAGNOSIS — Z79.82: ICD-10-CM

## 2020-04-29 LAB
ANION GAP SERPL CALC-SCNC: 6 MMOL/L (ref 5–15)
BASOPHILS # BLD AUTO: 0.04 X10^3/UL (ref 0–0.1)
BASOPHILS NFR BLD AUTO: 1 % (ref 0–1)
CALCIUM SERPL-MCNC: 8.9 MG/DL (ref 8.5–10.1)
CHLORIDE SERPL-SCNC: 113 MMOL/L (ref 98–107)
CREAT SERPL-MCNC: 0.71 MG/DL (ref 0.55–1.02)
EOSINOPHIL # BLD AUTO: 0.22 X10^3/UL (ref 0–0.4)
EOSINOPHIL NFR BLD AUTO: 3 % (ref 1–7)
ERYTHROCYTE [DISTWIDTH] IN BLOOD BY AUTOMATED COUNT: 13.4 % (ref 9.6–15.2)
LYMPHOCYTES # BLD AUTO: 2.62 X10^3/UL (ref 1–3.4)
LYMPHOCYTES NFR BLD AUTO: 31 % (ref 22–44)
MCH RBC QN AUTO: 32.7 PG (ref 27–34.8)
MCHC RBC AUTO-ENTMCNC: 33.4 G/DL (ref 32.4–35.8)
MCV RBC AUTO: 98.1 FL (ref 80–100)
MD: NO
MONOCYTES # BLD AUTO: 0.77 X10^3/UL (ref 0.2–0.8)
MONOCYTES NFR BLD AUTO: 9 % (ref 2–9)
NEUTROPHILS # BLD AUTO: 4.72 X10^3/UL (ref 1.8–6.8)
NEUTROPHILS NFR BLD AUTO: 56 % (ref 42–75)
PLATELET # BLD AUTO: 233 X10^3/UL (ref 130–400)
PMV BLD AUTO: 8.7 FL (ref 7.4–10.4)
RBC # BLD AUTO: 4.07 X10^6/UL (ref 3.82–5.3)

## 2020-04-29 PROCEDURE — C1894 INTRO/SHEATH, NON-LASER: HCPCS

## 2020-04-29 PROCEDURE — 93458 L HRT ARTERY/VENTRICLE ANGIO: CPT

## 2020-04-29 PROCEDURE — 80048 BASIC METABOLIC PNL TOTAL CA: CPT

## 2020-04-29 PROCEDURE — C1725 CATH, TRANSLUMIN NON-LASER: HCPCS

## 2020-04-29 PROCEDURE — G0378 HOSPITAL OBSERVATION PER HR: HCPCS

## 2020-04-29 PROCEDURE — C1874 STENT, COATED/COV W/DEL SYS: HCPCS

## 2020-04-29 PROCEDURE — C1887 CATHETER, GUIDING: HCPCS

## 2020-04-29 PROCEDURE — C1769 GUIDE WIRE: HCPCS

## 2020-04-29 PROCEDURE — 36415 COLL VENOUS BLD VENIPUNCTURE: CPT

## 2020-04-29 PROCEDURE — C9600 PERC DRUG-EL COR STENT SING: HCPCS

## 2020-04-29 PROCEDURE — 96365 THER/PROPH/DIAG IV INF INIT: CPT

## 2020-04-29 PROCEDURE — 99157 MOD SED OTHER PHYS/QHP EA: CPT

## 2020-04-29 PROCEDURE — 99156 MOD SED OTH PHYS/QHP 5/>YRS: CPT

## 2020-04-29 PROCEDURE — 94640 AIRWAY INHALATION TREATMENT: CPT

## 2020-04-29 PROCEDURE — 85025 COMPLETE CBC W/AUTO DIFF WBC: CPT

## 2020-04-29 PROCEDURE — 93005 ELECTROCARDIOGRAM TRACING: CPT

## 2020-04-29 PROCEDURE — 96366 THER/PROPH/DIAG IV INF ADDON: CPT

## 2020-04-29 RX ADMIN — TICAGRELOR SCH MG: 90 TABLET ORAL at 21:31

## 2020-04-29 RX ADMIN — BIVALIRUDIN SCH MLS/HR: 250 INJECTION, POWDER, LYOPHILIZED, FOR SOLUTION INTRAVENOUS at 13:27

## 2020-04-29 RX ADMIN — BIVALIRUDIN SCH MLS/HR: 250 INJECTION, POWDER, LYOPHILIZED, FOR SOLUTION INTRAVENOUS at 13:10

## 2020-04-29 RX ADMIN — METOPROLOL TARTRATE SCH MG: 25 TABLET, FILM COATED ORAL at 21:31

## 2020-04-29 RX ADMIN — BUDESONIDE SCH MG: 0.5 INHALANT RESPIRATORY (INHALATION) at 19:28

## 2020-04-30 VITALS — SYSTOLIC BLOOD PRESSURE: 121 MMHG | DIASTOLIC BLOOD PRESSURE: 69 MMHG

## 2020-04-30 VITALS — DIASTOLIC BLOOD PRESSURE: 90 MMHG | SYSTOLIC BLOOD PRESSURE: 149 MMHG

## 2020-04-30 LAB
ANION GAP SERPL CALC-SCNC: 6 MMOL/L (ref 5–15)
CALCIUM SERPL-MCNC: 9 MG/DL (ref 8.5–10.1)
CHLORIDE SERPL-SCNC: 113 MMOL/L (ref 98–107)
CREAT SERPL-MCNC: 0.7 MG/DL (ref 0.55–1.02)

## 2020-04-30 RX ADMIN — TICAGRELOR SCH MG: 90 TABLET ORAL at 08:27

## 2020-04-30 RX ADMIN — METOPROLOL TARTRATE SCH MG: 25 TABLET, FILM COATED ORAL at 08:27

## 2020-04-30 RX ADMIN — BUDESONIDE SCH MG: 0.5 INHALANT RESPIRATORY (INHALATION) at 08:50

## 2020-07-21 ENCOUNTER — HOSPITAL ENCOUNTER (OUTPATIENT)
Dept: HOSPITAL 8 - CVU | Age: 65
Discharge: HOME | End: 2020-07-21
Attending: INTERNAL MEDICINE
Payer: COMMERCIAL

## 2020-07-21 DIAGNOSIS — R60.9: ICD-10-CM

## 2020-07-21 DIAGNOSIS — I25.10: ICD-10-CM

## 2020-07-21 DIAGNOSIS — I35.1: Primary | ICD-10-CM

## 2020-07-21 PROCEDURE — 93306 TTE W/DOPPLER COMPLETE: CPT

## 2020-08-04 ENCOUNTER — HOSPITAL ENCOUNTER (OUTPATIENT)
Dept: HOSPITAL 8 - CVU | Age: 65
Discharge: HOME | End: 2020-08-04
Attending: INTERNAL MEDICINE
Payer: COMMERCIAL

## 2020-08-04 DIAGNOSIS — I25.2: ICD-10-CM

## 2020-08-04 DIAGNOSIS — I25.10: Primary | ICD-10-CM

## 2020-08-04 DIAGNOSIS — R60.9: ICD-10-CM

## 2020-08-04 PROCEDURE — 93970 EXTREMITY STUDY: CPT

## 2020-10-09 ENCOUNTER — HOSPITAL ENCOUNTER (OUTPATIENT)
Dept: HOSPITAL 8 - CACL | Age: 65
Discharge: HOME | End: 2020-10-09
Attending: INTERNAL MEDICINE
Payer: COMMERCIAL

## 2020-10-09 VITALS — DIASTOLIC BLOOD PRESSURE: 89 MMHG | SYSTOLIC BLOOD PRESSURE: 129 MMHG

## 2020-10-09 VITALS — HEIGHT: 69 IN | WEIGHT: 179.9 LBS | BODY MASS INDEX: 26.64 KG/M2

## 2020-10-09 DIAGNOSIS — I25.110: Primary | ICD-10-CM

## 2020-10-09 DIAGNOSIS — Z79.899: ICD-10-CM

## 2020-10-09 DIAGNOSIS — Z95.5: ICD-10-CM

## 2020-10-09 DIAGNOSIS — Z79.82: ICD-10-CM

## 2020-10-09 DIAGNOSIS — Z88.2: ICD-10-CM

## 2020-10-09 DIAGNOSIS — I25.2: ICD-10-CM

## 2020-10-09 DIAGNOSIS — Z88.0: ICD-10-CM

## 2020-10-09 DIAGNOSIS — E78.5: ICD-10-CM

## 2020-10-09 LAB
ANION GAP SERPL CALC-SCNC: 8 MMOL/L (ref 5–15)
BASOPHILS # BLD AUTO: 0 X10^3/UL (ref 0–0.1)
BASOPHILS NFR BLD AUTO: 1 % (ref 0–1)
CALCIUM SERPL-MCNC: 8.7 MG/DL (ref 8.5–10.1)
CHLORIDE SERPL-SCNC: 113 MMOL/L (ref 98–107)
CREAT SERPL-MCNC: 0.72 MG/DL (ref 0.55–1.02)
EOSINOPHIL # BLD AUTO: 0.3 X10^3/UL (ref 0–0.4)
EOSINOPHIL NFR BLD AUTO: 4 % (ref 1–7)
ERYTHROCYTE [DISTWIDTH] IN BLOOD BY AUTOMATED COUNT: 13.5 % (ref 9.6–15.2)
LYMPHOCYTES # BLD AUTO: 2.4 X10^3/UL (ref 1–3.4)
LYMPHOCYTES NFR BLD AUTO: 34 % (ref 22–44)
MCH RBC QN AUTO: 32.7 PG (ref 27–34.8)
MCHC RBC AUTO-ENTMCNC: 32.8 G/DL (ref 32.4–35.8)
MD: NO
MONOCYTES # BLD AUTO: 0.7 X10^3/UL (ref 0.2–0.8)
MONOCYTES NFR BLD AUTO: 9 % (ref 2–9)
NEUTROPHILS # BLD AUTO: 3.8 X10^3/UL (ref 1.8–6.8)
NEUTROPHILS NFR BLD AUTO: 53 % (ref 42–75)
PLATELET # BLD AUTO: 241 X10^3/UL (ref 130–400)
PMV BLD AUTO: 7.9 FL (ref 7.4–10.4)
RBC # BLD AUTO: 3.79 X10^6/UL (ref 3.82–5.3)

## 2020-10-09 PROCEDURE — 93458 L HRT ARTERY/VENTRICLE ANGIO: CPT

## 2020-10-09 PROCEDURE — 80048 BASIC METABOLIC PNL TOTAL CA: CPT

## 2020-10-09 PROCEDURE — C1769 GUIDE WIRE: HCPCS

## 2020-10-09 PROCEDURE — 99156 MOD SED OTH PHYS/QHP 5/>YRS: CPT

## 2020-10-09 PROCEDURE — 36415 COLL VENOUS BLD VENIPUNCTURE: CPT

## 2020-10-09 PROCEDURE — C1894 INTRO/SHEATH, NON-LASER: HCPCS

## 2020-10-09 PROCEDURE — 85025 COMPLETE CBC W/AUTO DIFF WBC: CPT

## 2021-02-06 ENCOUNTER — OFFICE VISIT (OUTPATIENT)
Dept: URGENT CARE | Facility: PHYSICIAN GROUP | Age: 66
End: 2021-02-06
Payer: COMMERCIAL

## 2021-02-06 VITALS
DIASTOLIC BLOOD PRESSURE: 68 MMHG | OXYGEN SATURATION: 95 % | SYSTOLIC BLOOD PRESSURE: 108 MMHG | TEMPERATURE: 97.8 F | HEART RATE: 96 BPM | BODY MASS INDEX: 27.7 KG/M2 | WEIGHT: 187 LBS | HEIGHT: 69 IN

## 2021-02-06 DIAGNOSIS — N39.0 ACUTE URINARY TRACT INFECTION: ICD-10-CM

## 2021-02-06 LAB
APPEARANCE UR: NORMAL
BILIRUB UR STRIP-MCNC: NORMAL MG/DL
COLOR UR AUTO: YELLOW
GLUCOSE UR STRIP.AUTO-MCNC: NORMAL MG/DL
KETONES UR STRIP.AUTO-MCNC: NORMAL MG/DL
LEUKOCYTE ESTERASE UR QL STRIP.AUTO: NORMAL
NITRITE UR QL STRIP.AUTO: POSITIVE
PH UR STRIP.AUTO: 7 [PH] (ref 5–8)
PROT UR QL STRIP: 100 MG/DL
RBC UR QL AUTO: NORMAL
SP GR UR STRIP.AUTO: 1.02
UROBILINOGEN UR STRIP-MCNC: 0.2 MG/DL

## 2021-02-06 PROCEDURE — 81002 URINALYSIS NONAUTO W/O SCOPE: CPT | Performed by: FAMILY MEDICINE

## 2021-02-06 PROCEDURE — 99214 OFFICE O/P EST MOD 30 MIN: CPT | Performed by: FAMILY MEDICINE

## 2021-02-06 RX ORDER — ASPIRIN 81 MG/1
81 TABLET, CHEWABLE ORAL DAILY
COMMUNITY

## 2021-02-06 RX ORDER — METOPROLOL SUCCINATE 25 MG/1
25 TABLET, EXTENDED RELEASE ORAL DAILY
COMMUNITY

## 2021-02-06 RX ORDER — ATORVASTATIN CALCIUM 40 MG/1
40 TABLET, FILM COATED ORAL NIGHTLY
COMMUNITY

## 2021-02-06 RX ORDER — CLOPIDOGREL BISULFATE 75 MG/1
75 TABLET ORAL DAILY
COMMUNITY

## 2021-02-06 RX ORDER — CIPROFLOXACIN 500 MG/1
500 TABLET, FILM COATED ORAL EVERY 12 HOURS
Qty: 10 TAB | Refills: 0 | Status: SHIPPED | OUTPATIENT
Start: 2021-02-06 | End: 2021-02-11

## 2021-02-06 RX ORDER — PHENAZOPYRIDINE HYDROCHLORIDE 200 MG/1
TABLET, FILM COATED ORAL
Qty: 9 TAB | Refills: 0 | Status: SHIPPED | OUTPATIENT
Start: 2021-02-06

## 2021-02-06 RX ORDER — HYDROCHLOROTHIAZIDE 12.5 MG/1
12.5 TABLET ORAL DAILY
COMMUNITY

## 2021-02-06 RX ORDER — NITROGLYCERIN 0.4 MG/1
0.4 TABLET SUBLINGUAL
COMMUNITY

## 2021-02-06 NOTE — PROGRESS NOTES
Chief Complaint:    Chief Complaint   Patient presents with   • Blood in Urine     dysuria, 4 days       History of Present Illness:    This is a new problem. Symptoms x 4 days. Has dysuria and hematuria. Feels like typical UTI. Cipro and Pyridium have worked and been tolerated for previous UTI.    Last positive urine culture in Epic (8/28/18) noted below. She has not had UTI since.    Urine Culture  Order: [id number]  Status:  Final result   Visible to patient:  Yes (MyChart) Next appt:  None Dx:  Acute cystitis with hematuria  Specimen Information: Urine        Component 2yr ago   Significant Indicator POSPositive (POS)    Source UR    Site    Urine Culture Mixed skin renetta 10-50,000 cfu/mLAbnormal     Urine Culture Abnormal   Escherichia coli   10-50,000 cfu/mL     Resulting Agency [facility]   Susceptibility     Escherichia coli     AIDE     Ampicillin >16 mcg/mL Resistant     Cefepime <=8 mcg/mL Sensitive     Cefotaxime <=2 mcg/mL Sensitive     Cefotetan <=16 mcg/mL Sensitive     Ceftazidime <=1 mcg/mL Sensitive     Ceftriaxone <=8 mcg/mL Sensitive     Cefuroxime <=4 mcg/mL Sensitive     Cephalothin 16 mcg/mL Intermediate     Ciprofloxacin <=1 mcg/mL Sensitive     Gentamicin <=4 mcg/mL Sensitive     Levofloxacin <=2 mcg/mL Sensitive     Nitrofurantoin <=32 mcg/mL Sensitive     Pip/Tazobactam <=16 mcg/mL Sensitive     Piperacillin >64 mcg/mL Resistant     Tigecycline <=2 mcg/mL Sensitive     Tobramycin <=4 mcg/mL Sensitive     Trimeth/Sulfa <=2/38 mcg/mL Sensitive                 Review of Systems:    Constitutional: Negative for fever, chills, and diaphoresis.   Eyes: Negative for change in vision, photophobia, pain, redness, and discharge.  ENT: Negative for ear pain, ear discharge, hearing loss, tinnitus, nasal congestion, nosebleeds, and sore throat.    Respiratory: Negative for cough, hemoptysis, sputum production, shortness of breath, wheezing, and stridor.    Cardiovascular: Negative for chest pain, palpitations,  orthopnea, claudication, leg swelling, and PND.   Gastrointestinal: Negative for abdominal pain, nausea, vomiting, diarrhea, constipation, blood in stool, and melena.   Genitourinary: See HPI.  Musculoskeletal: Negative for myalgias, joint pain, neck pain, and back pain.   Skin: Negative for rash and itching.   Neurological: Negative for dizziness, tingling, tremors, sensory change, speech change, focal weakness, seizures, loss of consciousness, and headaches.   Endo: Negative for polydipsia.   Heme: On Plavix.  Psychiatric/Behavioral: Negative for depression, suicidal ideas, hallucinations, memory loss and substance abuse. The patient is not nervous/anxious and does not have insomnia.        Past Medical History:    History reviewed. No pertinent past medical history.    Past Surgical History:    History reviewed. No pertinent surgical history.    Social History:    Social History     Socioeconomic History   • Marital status:      Spouse name: Not on file   • Number of children: Not on file   • Years of education: Not on file   • Highest education level: Not on file   Occupational History   • Not on file   Social Needs   • Financial resource strain: Not on file   • Food insecurity     Worry: Not on file     Inability: Not on file   • Transportation needs     Medical: Not on file     Non-medical: Not on file   Tobacco Use   • Smoking status: Light Tobacco Smoker     Types: Cigarettes   • Smokeless tobacco: Former User   • Tobacco comment: social smoker    Substance and Sexual Activity   • Alcohol use: Yes     Comment: rare   • Drug use: No   • Sexual activity: Not on file   Lifestyle   • Physical activity     Days per week: Not on file     Minutes per session: Not on file   • Stress: Not on file   Relationships   • Social connections     Talks on phone: Not on file     Gets together: Not on file     Attends Jew service: Not on file     Active member of club or organization: Not on file     Attends  "meetings of clubs or organizations: Not on file     Relationship status: Not on file   • Intimate partner violence     Fear of current or ex partner: Not on file     Emotionally abused: Not on file     Physically abused: Not on file     Forced sexual activity: Not on file   Other Topics Concern   • Not on file   Social History Narrative   • Not on file     Family History:    History reviewed. No pertinent family history.    Medications:    Current Outpatient Medications on File Prior to Visit   Medication Sig Dispense Refill   • atorvastatin (LIPITOR) 40 MG Tab Take 40 mg by mouth every evening.     • metoprolol SR (TOPROL XL) 25 MG TABLET SR 24 HR Take 25 mg by mouth every day. Takes 1/2 qam and the other qpm     • hydroCHLOROthiazide (HYDRODIURIL) 12.5 MG tablet Take 12.5 mg by mouth every day.     • clopidogrel (PLAVIX) 75 MG Tab Take 75 mg by mouth every day.     • nitroglycerin (NITROSTAT) 0.4 MG SL Tab Place 0.4 mg under the tongue every 5 minutes as needed for Chest Pain.     • aspirin (ASA) 81 MG Chew Tab chewable tablet Chew 81 mg every day.     • albuterol 108 (90 Base) MCG/ACT Aero Soln inhalation aerosol Inhale 2 Puffs by mouth every four hours as needed. 1 Inhaler 0     No current facility-administered medications on file prior to visit.      Allergies:    Allergies   Allergen Reactions   • Pcn [Penicillins]    • Sulfa Drugs        Vitals:    Vitals:    02/06/21 1351   BP: 108/68   BP Location: Right arm   Patient Position: Sitting   BP Cuff Size: Adult   Pulse: 96   Temp: 36.6 °C (97.8 °F)   TempSrc: Temporal   SpO2: 95%   Weight: 84.8 kg (187 lb)   Height: 1.753 m (5' 9\")       Physical Exam:    Constitutional: Vital signs reviewed. Appears well-developed and well-nourished. No acute distress.   Eyes: Sclera white, conjunctivae clear.   ENT: External ears normal. Hearing normal.  Neck: Neck supple.   Pulmonary/Chest: Respirations non-labored.   Abdomen: Bowel sounds are normal active. Soft, " non-distended, and non-tender to palpation.    Musculoskeletal: No CVA TTP bilaterally. Normal gait. Normal range of motion. No muscular atrophy or weakness.  Neurological: Alert and oriented to person, place, and time. Muscle tone normal. Coordination normal. Light touch and sensation normal.   Skin: No rashes or lesions. Warm, dry, normal turgor.  Psychiatric: Normal mood and affect. Behavior is normal. Judgment and thought content normal.     Diagnostics:    POCT Urinalysis  Order: [id number]  Status:  Final result   Visible to patient:  No (not released) Next appt:  None Dx:  Acute urinary tract infection   Ref Range & Units  2:06 PM 2yr ago   POC Color Negative yellow      POC Appearance Negative couldy      POC Leukocyte Esterase Negative large  Moderate    POC Nitrites Negative positive  Positive    POC Urobiligen Negative (0.2) mg/dL 0.2  Neg    POC Protein Negative mg/dL 100  100    POC Urine PH 5.0 - 8.0 7.0  7    POC Blood Negative large  Moderate    POC Specific Gravity <1.005 - >1.030 1.020  1.020    POC Ketones Negative mg/dL neg  Neg    POC Bilirubin Negative mg/dL neg  Neg    POC Glucose Negative mg/dL neg  Neg          Specimen Collected: 02/06/21  2:06 PM Last Resulted: 02/06/21  2:17 PM             Assessment / Plan:    1. Acute urinary tract infection  - POCT Urinalysis  - ciprofloxacin (CIPRO) 500 MG Tab; Take 1 Tab by mouth every 12 hours for 5 days.  Dispense: 10 Tab; Refill: 0  - phenazopyridine (PYRIDIUM) 200 MG Tab; 1 TAB BY MOUTH UP TO 3 TIMES A DAY ONLY IF NEEDED FOR BLADDER OR URINARY PAIN. WILL TURN URINE ORANGE.  Dispense: 9 Tab; Refill: 0      Discussed with her DDX, management options, and risks, benefits, and alternatives to treatment plan agreed upon.    Declines urine culture.    Agreeable to medications prescribed.    Discussed expected course of duration, time for improvement, and to seek follow-up in Emergency Room, urgent care, or with PCP if getting worse at any time or not  improving within expected time frame.

## 2021-04-06 ENCOUNTER — HOSPITAL ENCOUNTER (OUTPATIENT)
Dept: LAB | Facility: MEDICAL CENTER | Age: 66
End: 2021-04-06
Attending: INTERNAL MEDICINE
Payer: COMMERCIAL

## 2021-04-06 PROCEDURE — 80053 COMPREHEN METABOLIC PANEL: CPT

## 2021-04-06 PROCEDURE — 80061 LIPID PANEL: CPT

## 2021-04-06 PROCEDURE — 36415 COLL VENOUS BLD VENIPUNCTURE: CPT

## 2021-04-07 LAB
ALBUMIN SERPL BCP-MCNC: 4.2 G/DL (ref 3.2–4.9)
ALBUMIN/GLOB SERPL: 1.6 G/DL
ALP SERPL-CCNC: 106 U/L (ref 30–99)
ALT SERPL-CCNC: 21 U/L (ref 2–50)
ANION GAP SERPL CALC-SCNC: 10 MMOL/L (ref 7–16)
AST SERPL-CCNC: 14 U/L (ref 12–45)
BILIRUB SERPL-MCNC: 0.4 MG/DL (ref 0.1–1.5)
BUN SERPL-MCNC: 13 MG/DL (ref 8–22)
CALCIUM SERPL-MCNC: 9.4 MG/DL (ref 8.5–10.5)
CHLORIDE SERPL-SCNC: 106 MMOL/L (ref 96–112)
CHOLEST SERPL-MCNC: 149 MG/DL (ref 100–199)
CO2 SERPL-SCNC: 26 MMOL/L (ref 20–33)
CREAT SERPL-MCNC: 0.83 MG/DL (ref 0.5–1.4)
FASTING STATUS PATIENT QL REPORTED: NORMAL
GLOBULIN SER CALC-MCNC: 2.7 G/DL (ref 1.9–3.5)
GLUCOSE SERPL-MCNC: 91 MG/DL (ref 65–99)
HDLC SERPL-MCNC: 55 MG/DL
LDLC SERPL CALC-MCNC: 79 MG/DL
POTASSIUM SERPL-SCNC: 4 MMOL/L (ref 3.6–5.5)
PROT SERPL-MCNC: 6.9 G/DL (ref 6–8.2)
SODIUM SERPL-SCNC: 142 MMOL/L (ref 135–145)
TRIGL SERPL-MCNC: 74 MG/DL (ref 0–149)

## 2021-12-24 ENCOUNTER — OFFICE VISIT (OUTPATIENT)
Dept: URGENT CARE | Facility: PHYSICIAN GROUP | Age: 66
End: 2021-12-24
Payer: COMMERCIAL

## 2021-12-24 VITALS
HEIGHT: 69 IN | RESPIRATION RATE: 12 BRPM | TEMPERATURE: 97.7 F | WEIGHT: 175 LBS | DIASTOLIC BLOOD PRESSURE: 60 MMHG | SYSTOLIC BLOOD PRESSURE: 102 MMHG | HEART RATE: 79 BPM | BODY MASS INDEX: 25.92 KG/M2 | OXYGEN SATURATION: 98 %

## 2021-12-24 DIAGNOSIS — L03.115 CELLULITIS OF RIGHT LOWER EXTREMITY: ICD-10-CM

## 2021-12-24 PROCEDURE — 99213 OFFICE O/P EST LOW 20 MIN: CPT | Performed by: FAMILY MEDICINE

## 2021-12-24 RX ORDER — DOXYCYCLINE HYCLATE 100 MG
100 TABLET ORAL 2 TIMES DAILY
Qty: 14 TABLET | Refills: 0 | Status: SHIPPED | OUTPATIENT
Start: 2021-12-24 | End: 2021-12-31

## 2021-12-24 ASSESSMENT — ENCOUNTER SYMPTOMS
VOMITING: 0
FEVER: 0

## 2021-12-24 NOTE — PROGRESS NOTES
Subjective:     Vidhya Martinez is a 66 y.o. female who presents for Rash (present x 2 days, right leg and left ankle, states not covid ) and Chills (had one night really bad )    HPI  Pt presents for evaluation of an acute problem  Pt with rash on right leg and left ankle for the past 2 days   Left ankle only has a little erythema, and the right leg has quite a bit more  Erythema is painful  Area feels warm to touch  No erythema or swelling in the upper calf  No difficulties ambulating  Had some chills last night    Review of Systems   Constitutional: Negative for fever.   Gastrointestinal: Negative for vomiting.   Skin: Positive for rash.     PMH:  has no past medical history on file.  MEDS:   Current Outpatient Medications:   •  atorvastatin (LIPITOR) 40 MG Tab, Take 40 mg by mouth every evening., Disp: , Rfl:   •  metoprolol SR (TOPROL XL) 25 MG TABLET SR 24 HR, Take 25 mg by mouth every day. Takes 1/2 qam and the other qpm, Disp: , Rfl:   •  hydroCHLOROthiazide (HYDRODIURIL) 12.5 MG tablet, Take 12.5 mg by mouth every day., Disp: , Rfl:   •  clopidogrel (PLAVIX) 75 MG Tab, Take 75 mg by mouth every day., Disp: , Rfl:   •  nitroglycerin (NITROSTAT) 0.4 MG SL Tab, Place 0.4 mg under the tongue every 5 minutes as needed for Chest Pain., Disp: , Rfl:   •  aspirin (ASA) 81 MG Chew Tab chewable tablet, Chew 81 mg every day., Disp: , Rfl:   •  phenazopyridine (PYRIDIUM) 200 MG Tab, 1 TAB BY MOUTH UP TO 3 TIMES A DAY ONLY IF NEEDED FOR BLADDER OR URINARY PAIN. WILL TURN URINE ORANGE., Disp: 9 Tab, Rfl: 0  •  albuterol 108 (90 Base) MCG/ACT Aero Soln inhalation aerosol, Inhale 2 Puffs by mouth every four hours as needed., Disp: 1 Inhaler, Rfl: 0  ALLERGIES:   Allergies   Allergen Reactions   • Pcn [Penicillins]    • Sulfa Drugs      SURGHX: No past surgical history on file.  SOCHX:  reports that she has been smoking cigarettes. She has quit using smokeless tobacco. She reports current alcohol use. She reports that  "she does not use drugs.     Objective:   /60 (BP Location: Right arm, Patient Position: Sitting, BP Cuff Size: Adult)   Pulse 79   Temp 36.5 °C (97.7 °F) (Temporal)   Resp 12   Ht 1.753 m (5' 9\")   Wt 79.4 kg (175 lb)   SpO2 98%   BMI 25.84 kg/m²     Physical Exam  Constitutional:       General: She is not in acute distress.     Appearance: She is well-developed. She is not diaphoretic.   Pulmonary:      Effort: Pulmonary effort is normal.   Skin:     Comments: Left ankle with slight erythema over the lateral ankle  Right lower extremity with moderate erythema in an area approximately 10 cm x 5 cm over the anterior and lateral shin, no papules, open lesions, bleeding, or fluctuance appreciated   Neurological:      Mental Status: She is alert.       Assessment/Plan:   Assessment    1. Cellulitis of right lower extremity  - doxycycline (VIBRAMYCIN) 100 MG Tab; Take 1 Tablet by mouth 2 times a day for 7 days.  Dispense: 14 Tablet; Refill: 0    Patient with cellulitis of right lower extremity.  Will treat with course of doxycycline due to penicillin and sulfa allergy.  Reviewed follow-up precautions and will follow up as needed.    "

## 2022-07-26 ENCOUNTER — HOSPITAL ENCOUNTER (OUTPATIENT)
Dept: LAB | Facility: MEDICAL CENTER | Age: 67
End: 2022-07-26
Attending: STUDENT IN AN ORGANIZED HEALTH CARE EDUCATION/TRAINING PROGRAM
Payer: COMMERCIAL

## 2022-07-26 LAB
BASOPHILS # BLD AUTO: 0.8 % (ref 0–1.8)
BASOPHILS # BLD: 0.07 K/UL (ref 0–0.12)
EOSINOPHIL # BLD AUTO: 0.28 K/UL (ref 0–0.51)
EOSINOPHIL NFR BLD: 3.1 % (ref 0–6.9)
ERYTHROCYTE [DISTWIDTH] IN BLOOD BY AUTOMATED COUNT: 50.7 FL (ref 35.9–50)
HCT VFR BLD AUTO: 42.9 % (ref 37–47)
HGB BLD-MCNC: 14.4 G/DL (ref 12–16)
IMM GRANULOCYTES # BLD AUTO: 0.03 K/UL (ref 0–0.11)
IMM GRANULOCYTES NFR BLD AUTO: 0.3 % (ref 0–0.9)
LYMPHOCYTES # BLD AUTO: 3.27 K/UL (ref 1–4.8)
LYMPHOCYTES NFR BLD: 35.8 % (ref 22–41)
MCH RBC QN AUTO: 33.6 PG (ref 27–33)
MCHC RBC AUTO-ENTMCNC: 33.6 G/DL (ref 33.6–35)
MCV RBC AUTO: 100 FL (ref 81.4–97.8)
MONOCYTES # BLD AUTO: 0.72 K/UL (ref 0–0.85)
MONOCYTES NFR BLD AUTO: 7.9 % (ref 0–13.4)
NEUTROPHILS # BLD AUTO: 4.76 K/UL (ref 2–7.15)
NEUTROPHILS NFR BLD: 52.1 % (ref 44–72)
NRBC # BLD AUTO: 0 K/UL
NRBC BLD-RTO: 0 /100 WBC
PLATELET # BLD AUTO: 275 K/UL (ref 164–446)
PMV BLD AUTO: 10.8 FL (ref 9–12.9)
RBC # BLD AUTO: 4.29 M/UL (ref 4.2–5.4)
WBC # BLD AUTO: 9.1 K/UL (ref 4.8–10.8)

## 2022-07-26 PROCEDURE — 80053 COMPREHEN METABOLIC PANEL: CPT

## 2022-07-26 PROCEDURE — 36415 COLL VENOUS BLD VENIPUNCTURE: CPT

## 2022-07-26 PROCEDURE — 83880 ASSAY OF NATRIURETIC PEPTIDE: CPT

## 2022-07-26 PROCEDURE — 80061 LIPID PANEL: CPT

## 2022-07-26 PROCEDURE — 84443 ASSAY THYROID STIM HORMONE: CPT

## 2022-07-26 PROCEDURE — 85025 COMPLETE CBC W/AUTO DIFF WBC: CPT

## 2022-07-27 LAB
ALBUMIN SERPL BCP-MCNC: 4.4 G/DL (ref 3.2–4.9)
ALBUMIN/GLOB SERPL: 1.9 G/DL
ALP SERPL-CCNC: 117 U/L (ref 30–99)
ALT SERPL-CCNC: 16 U/L (ref 2–50)
ANION GAP SERPL CALC-SCNC: 10 MMOL/L (ref 7–16)
AST SERPL-CCNC: 20 U/L (ref 12–45)
BILIRUB SERPL-MCNC: 0.3 MG/DL (ref 0.1–1.5)
BUN SERPL-MCNC: 9 MG/DL (ref 8–22)
CALCIUM SERPL-MCNC: 9.7 MG/DL (ref 8.5–10.5)
CHLORIDE SERPL-SCNC: 104 MMOL/L (ref 96–112)
CHOLEST SERPL-MCNC: 144 MG/DL (ref 100–199)
CO2 SERPL-SCNC: 26 MMOL/L (ref 20–33)
CREAT SERPL-MCNC: 0.84 MG/DL (ref 0.5–1.4)
FASTING STATUS PATIENT QL REPORTED: NORMAL
GFR SERPLBLD CREATININE-BSD FMLA CKD-EPI: 77 ML/MIN/1.73 M 2
GLOBULIN SER CALC-MCNC: 2.3 G/DL (ref 1.9–3.5)
GLUCOSE SERPL-MCNC: 94 MG/DL (ref 65–99)
HDLC SERPL-MCNC: 53 MG/DL
LDLC SERPL CALC-MCNC: 61 MG/DL
NT-PROBNP SERPL IA-MCNC: 83 PG/ML (ref 0–125)
POTASSIUM SERPL-SCNC: 4 MMOL/L (ref 3.6–5.5)
PROT SERPL-MCNC: 6.7 G/DL (ref 6–8.2)
SODIUM SERPL-SCNC: 140 MMOL/L (ref 135–145)
TRIGL SERPL-MCNC: 149 MG/DL (ref 0–149)
TSH SERPL DL<=0.005 MIU/L-ACNC: 1.73 UIU/ML (ref 0.38–5.33)